# Patient Record
Sex: FEMALE | Race: OTHER | Employment: FULL TIME | URBAN - METROPOLITAN AREA
[De-identification: names, ages, dates, MRNs, and addresses within clinical notes are randomized per-mention and may not be internally consistent; named-entity substitution may affect disease eponyms.]

---

## 2018-08-10 ENCOUNTER — TELEPHONE (OUTPATIENT)
Dept: SURGERY | Age: 43
End: 2018-08-10

## 2018-08-10 ENCOUNTER — DOCUMENTATION ONLY (OUTPATIENT)
Dept: SURGERY | Age: 43
End: 2018-08-10

## 2018-08-10 ENCOUNTER — OFFICE VISIT (OUTPATIENT)
Dept: SURGERY | Age: 43
End: 2018-08-10

## 2018-08-10 VITALS
SYSTOLIC BLOOD PRESSURE: 113 MMHG | HEART RATE: 56 BPM | DIASTOLIC BLOOD PRESSURE: 70 MMHG | HEIGHT: 67 IN | BODY MASS INDEX: 26.21 KG/M2 | WEIGHT: 167 LBS

## 2018-08-10 DIAGNOSIS — R92.8 ABNORMAL MRI, BREAST: Primary | ICD-10-CM

## 2018-08-10 RX ORDER — ALPRAZOLAM 0.5 MG/1
TABLET ORAL
Refills: 0 | COMMUNITY
Start: 2018-05-21

## 2018-08-10 NOTE — COMMUNICATION BODY
HISTORY OF PRESENT ILLNESS  Tila Nath is a 39 y.o. female. HPI NEW Patient presents for consultation at the request of Dr. Donna Valdes and Heavenly Duarte, NP, for second opinion of recent breast MRI. The patient has been getting her breast imaging done at [facility]. A screening mammogram in March led to diagnostic images and a RIGHT breast biopsy (benign), which led to a breast MRI and recommendation for more biopsies. The patient has had a hard time getting the biopsies scheduled, especially since [facility] instructed her to schedule around day 10-12 of her menstrual cycle. Family history-  Mother had breast cancer at age 46. Breast imaging-  Screening mammogram 18 at [facility]: BI-RADS 0.  3D mammogram 3/6/18: BI-RADS 4. Architectural distortion right breast recommend stereo biopsy  RIGHT breast biopsy 3/27/18. Pathology benign. Breast MRI 18: BI-RADS 4. Foci seen on left breast biopsies recommended, right breast biopsy recommended. Past Medical History:   Diagnosis Date    Abnormal Pap smear     yes history of HSV    Herpes simplex without mention of complication     HX OTHER MEDICAL     history of HSV    Rhesus isoimmunization unspecified as to episode of care in pregnancy      Past Surgical History:   Procedure Laterality Date    HX  SECTION       Social History     Social History    Marital status:      Spouse name: N/A    Number of children: N/A    Years of education: N/A     Occupational History    Not on file. Social History Main Topics    Smoking status: Never Smoker    Smokeless tobacco: Never Used    Alcohol use Yes    Drug use: No    Sexual activity: Yes     Partners: Male     Other Topics Concern    Not on file     Social History Narrative     OB History      Para Term  AB Living    2 1 1   2    SAB TAB Ectopic Molar Multiple Live Births         1        Obstetric Comments    Menarche:  5. LMP: 18. # of Children:  2.   Age at Delivery of First Child:  38.   Hysterectomy/oophorectomy:  NO/NO. Breast Bx:  yes. Hx of Breast Feeding:  yes. BCP:  yes. Hormone therapy:  no.               Current Outpatient Prescriptions:     fluocinolone-hydroquinone-tretinoin (TRI-LATRELL) 0.01-4-0.05 % topical cream, Apply  to affected area nightly., Disp: , Rfl:     ALPRAZolam (XANAX) 0.5 mg tablet, TAKE 1 TABLET DAY OF PROCEDURE, Disp: , Rfl: 0    fluticasone (FLONASE) 50 mcg/actuation nasal spray, 2 Sprays by Both Nostrils route daily. , Disp: 1 Bottle, Rfl: 11    montelukast (SINGULAIR) 10 mg tablet, Take 1 Tab by mouth daily. , Disp: 30 Tab, Rfl: 11    cetirizine (ZYRTEC) 10 mg tablet, Take 1 Tab by mouth daily. , Disp: 30 Tab, Rfl: 11    mesalamine (CANASA) 1,000 mg suppository, Insert  into rectum two (2) times a day., Disp: , Rfl:     valacyclovir (VALTREX) 500 mg tablet, Take 500 mg by mouth two (2) times a day., Disp: , Rfl:   No Known Allergies      Review of Systems   Constitutional: Negative. HENT: Negative. Eyes: Negative. Respiratory: Negative. Cardiovascular: Negative. Gastrointestinal: Positive for constipation. Genitourinary: Negative. Musculoskeletal: Negative. Skin: Negative. Neurological: Negative. Endo/Heme/Allergies: Negative. Psychiatric/Behavioral: The patient has insomnia. Physical Exam   Constitutional: She is oriented to person, place, and time. She appears well-developed and well-nourished. HENT:   Head: Normocephalic. Eyes: EOM are normal.   Neck: Neck supple. Cardiovascular: Intact distal pulses. Pulmonary/Chest: Effort normal. Right breast exhibits no inverted nipple, no mass, no nipple discharge, no skin change and no tenderness. Left breast exhibits no inverted nipple, no mass, no nipple discharge, no skin change and no tenderness. Breasts are symmetrical.   Abdominal: Soft. Musculoskeletal: Normal range of motion. Lymphadenopathy:     She has no cervical adenopathy. She has no axillary adenopathy. Neurological: She is alert and oriented to person, place, and time. Skin: Skin is warm and dry. Psychiatric: She has a normal mood and affect. Nursing note and vitals reviewed. ASSESSMENT and PLAN    ICD-10-CM ICD-9-CM    1. Abnormal MRI, breast R92.8 793.89 MRI BX BREAST VAC LT 1ST LESION W/CLIP AND SPECIMEN     38 yo female with abnormal breast mri here for second opinion. I will review with our breast imagers. She wants her biopsies here. Likely this is mostly enhancement due to her cycle since she was not in optimal window for breast mri  Will have films uploaded into pacs and will call her after review.

## 2018-08-10 NOTE — PROGRESS NOTES
Type of Film: [x] CD [] FILMS  Type of Test: [x] MRI [x] MAMMO  From: [facility]  Given to: Dr. Med Singh is holding onto breast imaging CD.s  LOCATION  To be Downloaded into PACS:  YES

## 2018-08-10 NOTE — PATIENT INSTRUCTIONS
Breast Self-Exam: Care Instructions  Your Care Instructions    A breast self-exam is when you check your breasts for lumps or changes. This regular exam helps you learn how your breasts normally look and feel. Most breast problems or changes are not because of cancer. Breast self-exam is not a substitute for a mammogram. Having regular breast exams by your doctor and regular mammograms improve your chances of finding any problems with your breasts. Some women set a time each month to do a step-by-step breast self-exam. Other women like a less formal system. They might look at their breasts as they brush their teeth, or feel their breasts once in a while in the shower. If you notice a change in your breast, tell your doctor. Follow-up care is a key part of your treatment and safety. Be sure to make and go to all appointments, and call your doctor if you are having problems. It's also a good idea to know your test results and keep a list of the medicines you take. How do you do a breast self-exam?  · The best time to examine your breasts is usually one week after your menstrual period begins. Your breasts should not be tender then. If you do not have periods, you might do your exam on a day of the month that is easy to remember. · To examine your breasts:  ¨ Remove all your clothes above the waist and lie down. When you are lying down, your breast tissue spreads evenly over your chest wall, which makes it easier to feel all your breast tissue. ¨ Use the pads-not the fingertips-of the 3 middle fingers of your left hand to check your right breast. Move your fingers slowly in small coin-sized circles that overlap. ¨ Use three levels of pressure to feel of all your breast tissue. Use light pressure to feel the tissue close to the skin surface. Use medium pressure to feel a little deeper. Use firm pressure to feel your tissue close to your breastbone and ribs.  Use each pressure level to feel your breast tissue before moving on to the next spot. ¨ Check your entire breast, moving up and down as if following a strip from the collarbone to the bra line, and from the armpit to the ribs. Repeat until you have covered the entire breast.  ¨ Repeat this procedure for your left breast, using the pads of the 3 middle fingers of your right hand. · To examine your breasts while in the shower:  ¨ Place one arm over your head and lightly soap your breast on that side. ¨ Using the pads of your fingers, gently move your hand over your breast (in the strip pattern described above), feeling carefully for any lumps or changes. ¨ Repeat for the other breast.  · Have your doctor inspect anything you notice to see if you need further testing. Where can you learn more? Go to http://cole-bayron.info/. Enter P148 in the search box to learn more about \"Breast Self-Exam: Care Instructions. \"  Current as of: May 12, 2017  Content Version: 11.7  © 2934-9297 [employer]. Care instructions adapted under license by [employer] (which disclaims liability or warranty for this information). If you have questions about a medical condition or this instruction, always ask your healthcare professional. [place] [address] any warranty or liability for your use of this information.

## 2018-08-10 NOTE — LETTER
8/10/2018 1:39 PM 
 
Patient:  Mukesh Greenberg YOB: 1975 Date of Visit: 8/10/2018 Dear Marylene Prim, MD 
[address] [facility] [address] [address] VIA In Basket 
 : Thank you for referring Ms. Aldo Whiteside to me for evaluation/treatment. Below are the relevant portions of my assessment and plan of care. HISTORY OF PRESENT ILLNESS Mukesh Greenberg is a 39 y.o. female. HPI NEW Patient presents for consultation at the request of Dr. Lorenza Last and Blanca Mccarty, SARABJIT, for second opinion of recent breast MRI. The patient has been getting her breast imaging done at [facility]. A screening mammogram in March led to diagnostic images and a RIGHT breast biopsy (benign), which led to a breast MRI and recommendation for more biopsies. The patient has had a hard time getting the biopsies scheduled, especially since [facility] instructed her to schedule around day 10-12 of her menstrual cycle. Family history- 
Mother had breast cancer at age 46. Breast imaging- 
Screening mammogram 18 at [facility]: BI-RADS 0. 
3D mammogram 3/6/18: BI-RADS 4. Architectural distortion right breast recommend stereo biopsy RIGHT breast biopsy 3/27/18. Pathology benign. Breast MRI 18: BI-RADS 4. Foci seen on left breast biopsies recommended, right breast biopsy recommended. Past Medical History:  
Diagnosis Date  Abnormal Pap smear   
 yes history of HSV  Herpes simplex without mention of complication  HX OTHER MEDICAL   
 history of HSV  Rhesus isoimmunization unspecified as to episode of care in pregnancy Past Surgical History:  
Procedure Laterality Date  HX  SECTION Social History Social History  Marital status:  Spouse name: N/A  
 Number of children: N/A  
 Years of education: N/A Occupational History  Not on file. Social History Main Topics  Smoking status: Never Smoker  Smokeless tobacco: Never Used  Alcohol use Yes  Drug use: No  
 Sexual activity: Yes  
  Partners: Male Other Topics Concern  Not on file Social History Narrative OB History  Para Term  AB Living 2 1 1   2 SAB TAB Ectopic Molar Multiple Live Births 1 Obstetric Comments Menarche:  5. LMP: 18. # of Children:  2. Age at Delivery of First Child:  38.   Hysterectomy/oophorectomy:  NO/NO. Breast Bx:  yes. Hx of Breast Feeding:  yes. BCP:  yes. Hormone therapy:  no.  
 
  
  
 
 
Current Outpatient Prescriptions:  
  fluocinolone-hydroquinone-tretinoin (TRI-LATRELL) 0.01-4-0.05 % topical cream, Apply  to affected area nightly., Disp: , Rfl:  
  ALPRAZolam (XANAX) 0.5 mg tablet, TAKE 1 TABLET DAY OF PROCEDURE, Disp: , Rfl: 0 
  fluticasone (FLONASE) 50 mcg/actuation nasal spray, 2 Sprays by Both Nostrils route daily. , Disp: 1 Bottle, Rfl: 11 
  montelukast (SINGULAIR) 10 mg tablet, Take 1 Tab by mouth daily. , Disp: 30 Tab, Rfl: 11 
  cetirizine (ZYRTEC) 10 mg tablet, Take 1 Tab by mouth daily. , Disp: 30 Tab, Rfl: 11 
  mesalamine (CANASA) 1,000 mg suppository, Insert  into rectum two (2) times a day., Disp: , Rfl:  
  valacyclovir (VALTREX) 500 mg tablet, Take 500 mg by mouth two (2) times a day., Disp: , Rfl:  
No Known Allergies Review of Systems Constitutional: Negative. HENT: Negative. Eyes: Negative. Respiratory: Negative. Cardiovascular: Negative. Gastrointestinal: Positive for constipation. Genitourinary: Negative. Musculoskeletal: Negative. Skin: Negative. Neurological: Negative. Endo/Heme/Allergies: Negative. Psychiatric/Behavioral: The patient has insomnia. Physical Exam  
Constitutional: She is oriented to person, place, and time. She appears well-developed and well-nourished. HENT:  
Head: Normocephalic. Eyes: EOM are normal.  
Neck: Neck supple. Cardiovascular: Intact distal pulses. Pulmonary/Chest: Effort normal. Right breast exhibits no inverted nipple, no mass, no nipple discharge, no skin change and no tenderness. Left breast exhibits no inverted nipple, no mass, no nipple discharge, no skin change and no tenderness. Breasts are symmetrical.  
Abdominal: Soft. Musculoskeletal: Normal range of motion. Lymphadenopathy:  
  She has no cervical adenopathy. She has no axillary adenopathy. Neurological: She is alert and oriented to person, place, and time. Skin: Skin is warm and dry. Psychiatric: She has a normal mood and affect. Nursing note and vitals reviewed. ASSESSMENT and PLAN 
  ICD-10-CM ICD-9-CM 1. Abnormal MRI, breast R92.8 793.89 MRI BX BREAST VAC LT 1ST LESION W/CLIP AND SPECIMEN  
 
40 yo female with abnormal breast mri here for second opinion. I will review with our breast imagers. She wants her biopsies here. Likely this is mostly enhancement due to her cycle since she was not in optimal window for breast mri Will have films uploaded into pacs and will call her after review. If you have questions, please do not hesitate to call me. I look forward to following Ms. Honeycuttny Bernardino along with you. Sincerely, Ema Garcias MD

## 2018-08-10 NOTE — PROGRESS NOTES
HISTORY OF PRESENT ILLNESS  Ramon Kuhn is a 39 y.o. female. HPI NEW Patient presents for consultation at the request of Dr. Cole Shipley and Christina Arriaga NP, for second opinion of recent breast MRI. The patient has been getting her breast imaging done at [facility]. A screening mammogram in March led to diagnostic images and a RIGHT breast biopsy (benign), which led to a breast MRI and recommendation for more biopsies. The patient has had a hard time getting the biopsies scheduled, especially since [facility] instructed her to schedule around day 10-12 of her menstrual cycle. Family history-  Mother had breast cancer at age 46. Breast imaging-  Screening mammogram 18 at [facility]: BI-RADS 0.  3D mammogram 3/6/18: BI-RADS 4. Architectural distortion right breast recommend stereo biopsy  RIGHT breast biopsy 3/27/18. Pathology benign. Breast MRI 18: BI-RADS 4. Foci seen on left breast biopsies recommended, right breast biopsy recommended. Past Medical History:   Diagnosis Date    Abnormal Pap smear     yes history of HSV    Herpes simplex without mention of complication     HX OTHER MEDICAL     history of HSV    Rhesus isoimmunization unspecified as to episode of care in pregnancy      Past Surgical History:   Procedure Laterality Date    HX  SECTION       Social History     Social History    Marital status:      Spouse name: N/A    Number of children: N/A    Years of education: N/A     Occupational History    Not on file. Social History Main Topics    Smoking status: Never Smoker    Smokeless tobacco: Never Used    Alcohol use Yes    Drug use: No    Sexual activity: Yes     Partners: Male     Other Topics Concern    Not on file     Social History Narrative     OB History      Para Term  AB Living    2 1 1   2    SAB TAB Ectopic Molar Multiple Live Births         1        Obstetric Comments    Menarche:  5. LMP: 18. # of Children:  2.   Age at Delivery of First Child:  38.   Hysterectomy/oophorectomy:  NO/NO. Breast Bx:  yes. Hx of Breast Feeding:  yes. BCP:  yes. Hormone therapy:  no.               Current Outpatient Prescriptions:     fluocinolone-hydroquinone-tretinoin (TRI-LATRELL) 0.01-4-0.05 % topical cream, Apply  to affected area nightly., Disp: , Rfl:     ALPRAZolam (XANAX) 0.5 mg tablet, TAKE 1 TABLET DAY OF PROCEDURE, Disp: , Rfl: 0    fluticasone (FLONASE) 50 mcg/actuation nasal spray, 2 Sprays by Both Nostrils route daily. , Disp: 1 Bottle, Rfl: 11    montelukast (SINGULAIR) 10 mg tablet, Take 1 Tab by mouth daily. , Disp: 30 Tab, Rfl: 11    cetirizine (ZYRTEC) 10 mg tablet, Take 1 Tab by mouth daily. , Disp: 30 Tab, Rfl: 11    mesalamine (CANASA) 1,000 mg suppository, Insert  into rectum two (2) times a day., Disp: , Rfl:     valacyclovir (VALTREX) 500 mg tablet, Take 500 mg by mouth two (2) times a day., Disp: , Rfl:   No Known Allergies      Review of Systems   Constitutional: Negative. HENT: Negative. Eyes: Negative. Respiratory: Negative. Cardiovascular: Negative. Gastrointestinal: Positive for constipation. Genitourinary: Negative. Musculoskeletal: Negative. Skin: Negative. Neurological: Negative. Endo/Heme/Allergies: Negative. Psychiatric/Behavioral: The patient has insomnia. Physical Exam   Constitutional: She is oriented to person, place, and time. She appears well-developed and well-nourished. HENT:   Head: Normocephalic. Eyes: EOM are normal.   Neck: Neck supple. Cardiovascular: Intact distal pulses. Pulmonary/Chest: Effort normal. Right breast exhibits no inverted nipple, no mass, no nipple discharge, no skin change and no tenderness. Left breast exhibits no inverted nipple, no mass, no nipple discharge, no skin change and no tenderness. Breasts are symmetrical.   Abdominal: Soft. Musculoskeletal: Normal range of motion. Lymphadenopathy:     She has no cervical adenopathy. She has no axillary adenopathy. Neurological: She is alert and oriented to person, place, and time. Skin: Skin is warm and dry. Psychiatric: She has a normal mood and affect. Nursing note and vitals reviewed. ASSESSMENT and PLAN    ICD-10-CM ICD-9-CM    1. Abnormal MRI, breast R92.8 793.89 MRI BX BREAST VAC LT 1ST LESION W/CLIP AND SPECIMEN     38 yo female with abnormal breast mri here for second opinion. I will review with our breast imagers. She wants her biopsies here. Likely this is mostly enhancement due to her cycle since she was not in optimal window for breast mri  Will have films uploaded into pacs and will call her after review.

## 2018-08-13 ENCOUNTER — DOCUMENTATION ONLY (OUTPATIENT)
Dept: SURGERY | Age: 43
End: 2018-08-13

## 2018-08-13 NOTE — PROGRESS NOTES
Type of Film: [x] CD [] FILMS  Type of Test: [x] MRI [x] MAMMO  From: [facility]  Given to: Dr. Lev Ibrahim dropped breast imaging CDs off at [address] this AM.  To be Downloaded into PACS:  YES

## 2018-08-14 ENCOUNTER — TELEPHONE (OUTPATIENT)
Dept: MRI IMAGING | Age: 43
End: 2018-08-14

## 2018-08-15 ENCOUNTER — TELEPHONE (OUTPATIENT)
Dept: MRI IMAGING | Age: 43
End: 2018-08-15

## 2018-08-17 ENCOUNTER — TELEPHONE (OUTPATIENT)
Dept: MRI IMAGING | Age: 43
End: 2018-08-17

## 2018-08-17 DIAGNOSIS — R92.8 ABNORMAL MRI, BREAST: Primary | ICD-10-CM

## 2018-09-10 NOTE — PROGRESS NOTES
Type of Film: [x] CD [] FILMS  Type of Test: [x] MRI [x] MAMMO  From: [facility]  Given to: placed in [facility] into PACS:  YES

## 2018-09-12 ENCOUNTER — HOSPITAL ENCOUNTER (OUTPATIENT)
Dept: MRI IMAGING | Age: 43
Discharge: HOME | End: 2018-09-12
Attending: SURGERY

## 2018-09-12 DIAGNOSIS — R92.8 ABNORMAL MRI, BREAST: ICD-10-CM

## 2018-09-12 NOTE — PROGRESS NOTES
Breast MRI biopsies cancelled after Dr. Bindu Jimenez reviewed images from [facility] [place] and discussed with patient options. Patient and  talked with Dr. Bindu Jimenez. Patient to schedule Breast MRI in May 2019 and continue Mammography.

## 2019-03-09 ENCOUNTER — HOSPITAL ENCOUNTER (OUTPATIENT)
Dept: MAMMOGRAPHY | Age: 44
Discharge: HOME | End: 2019-03-09
Attending: SPECIALIST
Payer: COMMERCIAL

## 2019-03-09 DIAGNOSIS — Z12.39 SCREENING BREAST EXAMINATION: ICD-10-CM

## 2019-03-09 PROCEDURE — 77063 BREAST TOMOSYNTHESIS BI: CPT

## 2020-02-11 ENCOUNTER — HOSPITAL ENCOUNTER (OUTPATIENT)
Dept: ULTRASOUND IMAGING | Age: 45
Discharge: HOME | End: 2020-02-11
Payer: COMMERCIAL

## 2020-02-11 ENCOUNTER — HOSPITAL ENCOUNTER (OUTPATIENT)
Dept: MAMMOGRAPHY | Age: 45
Discharge: HOME | End: 2020-02-11
Payer: COMMERCIAL

## 2020-02-11 DIAGNOSIS — N63.0 LUMP OR MASS IN BREAST: ICD-10-CM

## 2020-02-11 DIAGNOSIS — N63.20 MASS OF LEFT BREAST: ICD-10-CM

## 2020-02-11 PROCEDURE — 77065 DX MAMMO INCL CAD UNI: CPT

## 2020-02-11 PROCEDURE — 76642 ULTRASOUND BREAST LIMITED: CPT

## 2020-05-01 LAB
SARS-COV-2 AB, IGG, CORG1M: NEGATIVE
SARS-COV-2 AB, IGM, CVABMT: NEGATIVE

## 2020-06-02 ENCOUNTER — HOSPITAL ENCOUNTER (OUTPATIENT)
Dept: MAMMOGRAPHY | Age: 45
Discharge: HOME | End: 2020-06-02
Attending: SPECIALIST
Payer: COMMERCIAL

## 2020-06-02 DIAGNOSIS — Z12.31 VISIT FOR SCREENING MAMMOGRAM: ICD-10-CM

## 2020-06-02 PROCEDURE — 77063 BREAST TOMOSYNTHESIS BI: CPT

## 2021-05-11 ENCOUNTER — TRANSCRIBE ORDER (OUTPATIENT)
Dept: SCHEDULING | Age: 46
End: 2021-05-11

## 2021-05-11 DIAGNOSIS — Z12.31 BREAST CANCER SCREENING BY MAMMOGRAM: Primary | ICD-10-CM

## 2021-05-21 ENCOUNTER — OFFICE VISIT (OUTPATIENT)
Dept: INTERNAL MEDICINE CLINIC | Age: 46
End: 2021-05-21

## 2021-05-21 VITALS
TEMPERATURE: 97.3 F | DIASTOLIC BLOOD PRESSURE: 70 MMHG | SYSTOLIC BLOOD PRESSURE: 109 MMHG | HEART RATE: 72 BPM | BODY MASS INDEX: 25.9 KG/M2 | OXYGEN SATURATION: 99 % | WEIGHT: 165 LBS | RESPIRATION RATE: 18 BRPM | HEIGHT: 67 IN

## 2021-05-21 DIAGNOSIS — E78.00 HIGH CHOLESTEROL: ICD-10-CM

## 2021-05-21 DIAGNOSIS — R63.5 WEIGHT GAIN: ICD-10-CM

## 2021-05-21 DIAGNOSIS — M75.42 IMPINGEMENT SYNDROME OF SHOULDER REGION, LEFT: Primary | ICD-10-CM

## 2021-05-21 DIAGNOSIS — K51.80 OTHER ULCERATIVE COLITIS WITHOUT COMPLICATION (HCC): ICD-10-CM

## 2021-05-21 DIAGNOSIS — Z00.00 ENCOUNTER FOR MEDICAL EXAMINATION TO ESTABLISH CARE: ICD-10-CM

## 2021-05-21 PROCEDURE — 99203 OFFICE O/P NEW LOW 30 MIN: CPT | Performed by: INTERNAL MEDICINE

## 2021-05-21 NOTE — PROGRESS NOTES
Reviewed record in preparation for visit and have obtained necessary documentation. Identified pt with two pt identifiers(name and ). Chief Complaint   Patient presents with   [facility]    Shoulder Pain       Health Maintenance Due   Topic Date Due    Hepatitis C Test  Never done    Pap Test  2019    Cholesterol Test   2021    Mammogram  2021       Ms. Veronique Ledesma has a reminder for a \"due or due soon\" health maintenance. I have asked that she discuss this further with her primary care provider for follow-up on this health maintenance. Coordination of Care Questionnaire:  :     1) Have you been to an emergency room, urgent care clinic since your last visit? no   Hospitalized since your last visit? no             2) Have you seen or consulted any other health care providers outside of [address] [place] [address] since your last visit? no  (Include any pap smears or colon screenings in this section.)    3) In the event something were to happen to you and you were unable to speak on your behalf, do you have an Advance Directive/ Living Will in place stating your wishes? NO    Do you have an Advance Directive on file? no    4) Are you interested in receiving information on Advance Directives? NO    Patient is accompanied by self I have received verbal consent from Kay Molina to discuss any/all medical information while they are present in the room.

## 2021-05-21 NOTE — PROGRESS NOTES
Ms. Ebony Weber is a new patient who is here to establish care. CC:  Establish Care and Shoulder Pain       HPI:      50 yo woman presenting to establish care  From [facility] / moved in 2001   Previously saw Dr. Joaquín Shields many years ago. Has ulcerative colitis and on mesalamine   Doing colonoscopy every 3 years Dr Sedrick Menchaca     History of elevated cholesterol doing lifestyle modification  LDL was 124 in 2018 then in 2020 improved to 100  HDL 70 and triglycerides 110     Working on weight loss has been struggling working out  and lost 5 lbs. current weight is 165 pounds. Having pain in left shoulder when moving shoulder back   Struggles with putting seat belt on due to pain. Reaching back is most painful. Uses that hand to speak on the phone as well.   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, translates to [address]       Hx of C section     Reviewed family history mother had breast cancer at age 46    Review of systems:  Constitutional: negative for fever, chills, weight loss, night sweats   Eyes : negative for vision changes, eye pain and discharge  Nose and Throat: negative for tinnitus, sore throat   Cardiovascular: negative for chest pain, palpitations and shortness of breath  Respiratory: negative for shortness of breath, cough and wheezing   Gastroinstestinal: negative for abdominal pain, nausea, vomiting, diarrhea, constipation, and blood in the stool  Musculoskeletal: See HPI  Genitourinary: negative for dysuria, nocturia, polyuria and hematuria   Neurologic: Negative for focal weakness, numbness or incoordination  Skin: negative for rash, pruritus  Hematologic: negative for easy bruising      Past Medical History:   Diagnosis Date    Abnormal Pap smear yes history of HSV    Herpes simplex without mention of complication     HX OTHER MEDICAL     history of HSV    Rhesus isoimmunization unspecified as to episode of care in pregnancy     Ulcerative colitis ([facility] [address]         Past Surgical History:   Procedure Laterality Date    HX  SECTION      CLARITA STEREO  BX BREAST RT 1ST LESION W/CLIP AND SPECIMEN Right 2018    benign       No Known Allergies    Current Outpatient Medications on File Prior to Visit   Medication Sig Dispense Refill    cetirizine (ZYRTEC) 10 mg tablet Take 1 Tab by mouth daily. 30 Tab 11    mesalamine (CANASA) 1,000 mg suppository Insert  into rectum two (2) times a day.  fluocinolone-hydroquinone-tretinoin (TRI-LATRELL) 0.01-4-0.05 % topical cream Apply  to affected area nightly. (Patient not taking: Reported on 2021)      ALPRAZolam (XANAX) 0.5 mg tablet TAKE 1 TABLET DAY OF PROCEDURE (Patient not taking: Reported on 2021)  0    fluticasone (FLONASE) 50 mcg/actuation nasal spray 2 Sprays by Both Nostrils route daily. (Patient not taking: Reported on 2021) 1 Bottle 11    montelukast (SINGULAIR) 10 mg tablet Take 1 Tab by mouth daily. (Patient not taking: Reported on 2021) 30 Tab 11    valacyclovir (VALTREX) 500 mg tablet Take 500 mg by mouth two (2) times a day. (Patient not taking: Reported on 2021)       No current facility-administered medications on file prior to visit. family history includes Breast Cancer (age of onset: 46) in her mother; Diabetes in her maternal grandmother and paternal grandmother; Hypertension in her father, paternal grandfather, and paternal uncle.     Social History     Socioeconomic History    Marital status:      Spouse name: Not on file    Number of children: Not on file    Years of education: Not on file    Highest education level: Not on file   Occupational History    Not on file   Tobacco Use    Smoking status: Never Smoker    Smokeless tobacco: Never Used   Substance and Sexual Activity    Alcohol use: Yes    Drug use: No    Sexual activity: Yes     Partners: Male   Other Topics Concern    Not on file   Social History Narrative    Not on file     Social Determinants of Health     Financial Resource Strain:     Difficulty of Paying Living Expenses:    Food Insecurity:     Worried About Running Out of Food in the Last Year:     [address] in the Last Year:    Transportation Needs:     Lack of Transportation (Medical):  Lack of Transportation (Non-Medical):    Physical Activity:     Days of Exercise per Week:     Minutes of Exercise per Session:    Stress:     Feeling of Stress :    Social Connections:     Frequency of Communication with Friends and Family:     Frequency of Social Gatherings with Friends and Family:     Attends Spiritism Services:     Active Member of Clubs or Organizations:     Attends Club or Organization Meetings:     Marital Status:    Intimate Partner Violence:     Fear of Current or Ex-Partner:     Emotionally Abused:     Physically Abused:     Sexually Abused:        Visit Vitals  /70 (BP 1 Location: Right arm, BP Patient Position: Sitting, BP Cuff Size: Adult)   Pulse 72   Temp 97.3 °F (36.3 °C) (Axillary)   Resp 18   Ht 5' 7\" (1.702 m)   Wt 165 lb (74.8 kg)   SpO2 99%   BMI 25.84 kg/m²     General:  Well appearing female no acute distress  HEENT:   PERRL,normal conjunctiva. External ear and canals normal, TMs normal.  Hearing normal to voice. Nose without edema or discharge, normal septum. Lips, teeth, gums normal.  Oropharynx: no erythema, no exudates, no lesions, normal tongue. Neck:  Supple. Thyroid normal size, nontender, without nodules. No carotid bruit. No masses or lymphadenopathy  Respiratory: no respiratory distress,  no wheezing, no rhonchi, no rales. No chest wall tenderness. Cardiovascular:  RRR, normal S1S2, no murmur.     Gastrointestinal: normal bowel sounds, soft, nontender, without masses. No hepatosplenomegaly. Extremities +2 pulses, no edema, normal sensation   Musculoskeletal:  Normal gait. Normal digits and nails. Normal strength and tone, no atrophy, and no abnormal movement. Normal inspection of left shoulder. Pain when lifting above 140 degrees. Pain when reaching back. Skin:  No rash, no lesions, no ulcers. Skin warm, normal turgor, without induration or nodules. Neuro:  A and OX4, fluent speech, cranial nerves normal 2-12. Sensation normal to light touch. DTR symmetrical  Psych:  Normal affect      Lab Results   Component Value Date/Time    WBC 11.7 (H) 06/08/2011 03:30 AM    HGB 9.8 (L) 06/08/2011 03:30 AM    HCT 29.5 (L) 06/08/2011 03:30 AM    PLATELET 077 02/05/5443 03:30 AM    MCV 86.5 06/08/2011 03:30 AM     No results found for: NA, K, CL, CO2, AGAP, GLU, BUN, CREA, BUCR, GFRAA, GFRNA, CA, GFRAA  No results found for: CHOL, CHOLPOCT, CHOLX, CHLST, CHOLV, HDL, HDLPOC, HDLP, LDL, LDLCPOC, LDLC, DLDLP, VLDLC, VLDL, TGLX, TRIGL, TRIGP, TGLPOCT, CHHD, CHHDX  No results found for: TSH, TSH2, TSH3, TSHP, TSHEXT, TSHEXT  Lab Results   Component Value Date/Time    Hemoglobin A1c, External 5.4 02/03/2016 12:00 AM     No results found for: VITD3, XQVID2, XQVID3, XQVID, VD3RIA                Assessment and Plan:     1. Suspect impingement syndrome of shoulder region, left  - REFERRAL TO PHYSICAL THERAPY  - XR SHOULDER LT AP/LAT MIN 2 V; Future  -Discussed using a headset at work so she is not holding her phone with the left arm    2. Encounter for medical examination to establish care  - LIPID PANEL; Future  - METABOLIC PANEL, COMPREHENSIVE; Future  - CBC WITH AUTOMATED DIFF; Future  - LIPID PANEL  - METABOLIC PANEL, COMPREHENSIVE  - CBC WITH AUTOMATED DIFF    3. High cholesterol  Recommend lifestyle changes eating a low-fat diet and regular next  - LIPID PANEL; Future  - METABOLIC PANEL, COMPREHENSIVE; Future  - CBC WITH AUTOMATED DIFF;  Future  - LIPID PANEL  - METABOLIC PANEL, COMPREHENSIVE  - CBC WITH AUTOMATED DIFF    4. Other ulcerative colitis without complication ([facility] [address]  Followed by Dr. Shauna Escoto and on mesalamine requesting records  - METABOLIC PANEL, COMPREHENSIVE; Future  - CBC WITH AUTOMATED DIFF; Future  - METABOLIC PANEL, COMPREHENSIVE  - CBC WITH AUTOMATED DIFF    5. Weight gain  - TSH 3RD GENERATION; Future  - HEMOGLOBIN A1C WITH EAG; Future  - TSH 3RD GENERATION  - HEMOGLOBIN A1C WITH EAG      Follow-up and Dispositions    · Return in about 1 year (around 5/21/2022).           Diana Perkins MD

## 2021-05-22 LAB
ALBUMIN SERPL-MCNC: 4.4 G/DL (ref 3.8–4.8)
ALBUMIN/GLOB SERPL: 1.8 {RATIO} (ref 1.2–2.2)
ALP SERPL-CCNC: 36 IU/L (ref 48–121)
ALT SERPL-CCNC: 7 IU/L (ref 0–32)
AST SERPL-CCNC: 24 IU/L (ref 0–40)
BASOPHILS # BLD AUTO: 0 X10E3/UL (ref 0–0.2)
BASOPHILS NFR BLD AUTO: 0 %
BILIRUB SERPL-MCNC: 0.2 MG/DL (ref 0–1.2)
BUN SERPL-MCNC: 10 MG/DL (ref 6–24)
BUN/CREAT SERPL: 12 (ref 9–23)
CALCIUM SERPL-MCNC: 9.5 MG/DL (ref 8.7–10.2)
CHLORIDE SERPL-SCNC: 103 MMOL/L (ref 96–106)
CHOLEST SERPL-MCNC: 182 MG/DL (ref 100–199)
CO2 SERPL-SCNC: 23 MMOL/L (ref 20–29)
CREAT SERPL-MCNC: 0.82 MG/DL (ref 0.57–1)
EOSINOPHIL # BLD AUTO: 0 X10E3/UL (ref 0–0.4)
EOSINOPHIL NFR BLD AUTO: 1 %
ERYTHROCYTE [DISTWIDTH] IN BLOOD BY AUTOMATED COUNT: 11.9 % (ref 11.7–15.4)
EST. AVERAGE GLUCOSE BLD GHB EST-MCNC: 103 MG/DL
GLOBULIN SER CALC-MCNC: 2.5 G/DL (ref 1.5–4.5)
GLUCOSE SERPL-MCNC: 88 MG/DL (ref 65–99)
HBA1C MFR BLD: 5.2 % (ref 4.8–5.6)
HCT VFR BLD AUTO: 40.9 % (ref 34–46.6)
HDLC SERPL-MCNC: 63 MG/DL
HGB BLD-MCNC: 13.6 G/DL (ref 11.1–15.9)
IMM GRANULOCYTES # BLD AUTO: 0 X10E3/UL (ref 0–0.1)
IMM GRANULOCYTES NFR BLD AUTO: 0 %
LDLC SERPL CALC-MCNC: 101 MG/DL (ref 0–99)
LYMPHOCYTES # BLD AUTO: 1.4 X10E3/UL (ref 0.7–3.1)
LYMPHOCYTES NFR BLD AUTO: 26 %
MCH RBC QN AUTO: 30.2 PG (ref 26.6–33)
MCHC RBC AUTO-ENTMCNC: 33.3 G/DL (ref 31.5–35.7)
MCV RBC AUTO: 91 FL (ref 79–97)
MONOCYTES # BLD AUTO: 0.5 X10E3/UL (ref 0.1–0.9)
MONOCYTES NFR BLD AUTO: 10 %
NEUTROPHILS # BLD AUTO: 3.4 X10E3/UL (ref 1.4–7)
NEUTROPHILS NFR BLD AUTO: 63 %
PLATELET # BLD AUTO: 269 X10E3/UL (ref 150–450)
POTASSIUM SERPL-SCNC: 4.6 MMOL/L (ref 3.5–5.2)
PROT SERPL-MCNC: 6.9 G/DL (ref 6–8.5)
RBC # BLD AUTO: 4.51 X10E6/UL (ref 3.77–5.28)
SODIUM SERPL-SCNC: 140 MMOL/L (ref 134–144)
TRIGL SERPL-MCNC: 98 MG/DL (ref 0–149)
TSH SERPL DL<=0.005 MIU/L-ACNC: 2.01 UIU/ML (ref 0.45–4.5)
VLDLC SERPL CALC-MCNC: 18 MG/DL (ref 5–40)
WBC # BLD AUTO: 5.4 X10E3/UL (ref 3.4–10.8)

## 2021-05-26 NOTE — PROGRESS NOTES
Normal cholesterol   Normal kidney and liver function   Normal blood count  Normal thyroid  No diabetes

## 2021-06-08 ENCOUNTER — HOSPITAL ENCOUNTER (OUTPATIENT)
Dept: MAMMOGRAPHY | Age: 46
Discharge: HOME | End: 2021-06-08
Attending: SPECIALIST
Payer: COMMERCIAL

## 2021-06-08 DIAGNOSIS — Z12.31 BREAST CANCER SCREENING BY MAMMOGRAM: ICD-10-CM

## 2021-06-08 PROCEDURE — 77063 BREAST TOMOSYNTHESIS BI: CPT

## 2021-06-09 ENCOUNTER — HOSPITAL ENCOUNTER (OUTPATIENT)
Dept: PHYSICAL THERAPY | Age: 46
Discharge: HOME | End: 2021-06-09
Payer: COMMERCIAL

## 2021-06-09 DIAGNOSIS — M75.42 IMPINGEMENT SYNDROME OF SHOULDER REGION, LEFT: ICD-10-CM

## 2021-06-09 PROCEDURE — 97140 MANUAL THERAPY 1/> REGIONS: CPT | Performed by: PHYSICAL THERAPIST

## 2021-06-09 PROCEDURE — 97110 THERAPEUTIC EXERCISES: CPT | Performed by: PHYSICAL THERAPIST

## 2021-06-09 PROCEDURE — 97161 PT EVAL LOW COMPLEX 20 MIN: CPT | Performed by: PHYSICAL THERAPIST

## 2021-06-09 NOTE — PROGRESS NOTES
PT INITIAL EVALUATION NOTE 2-15    Patient Name: Irma Berger  Date:2021  : 1975  [x]  Patient  Verified  Payor: Audra Johnston / Plan: Qiana Main PPO / Product Type: PPO /    In time:1135  Out time:1236  Total Treatment Time (min): 61  Visit #: 1     Treatment Area: Impingement syndrome of shoulder region, left [M75.42]    SUBJECTIVE  Pain Level (0-10 scale): 2-3  Any medication changes, allergies to medications, adverse drug reactions, diagnosis change, or new procedure performed?: [] No    [x] Yes (see summary sheet for update)  Subjective:     Pt reports that she started having pain 5-6 months ago one morning when she woke up. She thought she just slept wrong, but it has continued. She complains of difficulty reaching to grab the seatbelt. 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 She complains of increased pain with using the phone between her shoulder and ear. She is aware that she needs to try to get a headset. She has been able to return to sleeping on the left some with proper propping. She recently started going to the gym in April and she feels that she is getting stronger and the pain is improving. She has not tried to use any ice or heat to date. Her pain is isolated to the lateral and posterior aspect of the shoulder. She denies any radicular symptoms or superior shoulder or neck pain.       OBJECTIVE/EXAMINATION  OBJECTIVE  Posture:  Seated rounded shoulders and forward head, resting the left arm across her lap  Other Observations:  NA  Functional  and Pinch:  NA  Palpation: incresaed turgor and tenderness throughout the upper trap and cervical paraspinals    Shoulder AROM is WFL but left is less than right    Joint Mobility Assessment: Glenohumeral: good      Acromioclavicular: NT      Sternoclavicular: NT    UPPER QUARTER   MUSCLE STRENGTH  KEY       R  L  0 - No Contraction   Flexion  5  3  1 - Trace    Extension NT  NT  2 - Poor    Abduction 4  3  3 - Fair     IR  5  4  4 - Good    ER  4  3  5 - Normal       Neurological: Reflexes / Sensations: grossly intact  Special Tests: Neer Impingement: NT  Rich-Akin: -      Scapular Reposition: NT  Shoulder Abduction: +     Crank: NT    Load and Shift: NT    [place]: NT    Apprehension: +    Relocation : -   Speed's: +    Yergason: NT    AC Compression: -    AC Crossover: -      15 min Therapeutic Exercise:  [x] See flow sheet :   Rationale: increase ROM and increase strength to improve the patients ability to complete all activity    20 min Manual Therapy:  PROM with inf and post mobs, STM And CFM to uT and levator with gentle distraction   Rationale: decrease pain, increase ROM, increase tissue extensibility, decrease edema , correct positional vertigo and decrease trigger points  to improve the patients ability to complete all activity          With   [x] TE   [] TA   [] Neuro   [] SC   [] other: Patient Education: [x] Review HEP    [x] Progressed/Changed HEP based on:   [x] positioning   [] body mechanics   [] transfers   [] heat/ice application    [] other:        Other Objective/Functional Measures:   Pain Level (0-10 scale) post treatment: 2-3      ASSESSMENT:      [x]  See Plan of Maggy, PT 6/9/2021

## 2021-06-22 ENCOUNTER — HOSPITAL ENCOUNTER (OUTPATIENT)
Dept: PHYSICAL THERAPY | Age: 46
Discharge: HOME | End: 2021-06-22
Payer: COMMERCIAL

## 2021-06-22 PROCEDURE — 97140 MANUAL THERAPY 1/> REGIONS: CPT

## 2021-06-22 PROCEDURE — 97110 THERAPEUTIC EXERCISES: CPT

## 2021-06-22 NOTE — PROGRESS NOTES
[facility] Physical Therapy [address] ([facility] [address] Dennis Siegel Phone: [phone] Fax: [phone] Plan of Care/Statement of Necessity for Physical Therapy Services  2-15 Patient name: John Arellano  : 1975  Provider#: [id number] Referral source: Cruzito Reno MD     
Medical/Treatment Diagnosis: Impingement syndrome of shoulder region, left [M75.42] Prior Hospitalization: see medical history Comorbidities: *** 
Prior Level of Function: *** Medications: Verified on Patient Summary List 
 
Start of Care: ***      Onset Date: *** The Plan of Care and following information is based on the information from the initial evaluation. Assessment/ key information: *** Evaluation Complexity History {PT OP History :26854}; Examination {PT OP Examination :51102} ; Presentation {PT OP Presentation :54519} ; Clinical Decision Making {PT OP Decision Making :24150} Overall Complexity Rating: {PT OP Complexity:82731} Problem List: {BSHSI IP PROBLEM PKQP:03339} Treatment Plan may include any combination of the following: {Outpt PT Treatment FRMU:41956} Patient / Family readiness to learn indicated by: {Outpt PT Patient Family Readiness to Learn:55711} Persons(s) to be included in education: {IM Persons Education:63172} Barriers to Learning/Limitations: {barriers to learning/limitations:80290} Patient Goal (s): *** 
Patient Self Reported Health Status: {Outpt PT Rehabilitation Potential:57566} Rehabilitation Potential: {Outpt PT Rehabilitation Potential:25955} Short Term Goals: To be accomplished in *** {BSHSI OP WEEKS/TREATMENTS:}: 
 *** Long Term Goals: To be accomplished in *** {BSHSI OP WEEKS/TREATMENTS:}: 
 *** Frequency / Duration: Patient to be seen *** times per week for *** {BSHSI OP WEEKS/TREATMENTS:}.  
 
Patient/ Caregiver education and instruction: {Outpt PT patient caregiver ed.:59663} [x]  Plan of care has been reviewed with NEW Archer, PT 6/22/2021  
 
________________________________________________________________________ I certify that the above Therapy Services are being furnished while the patient is under my care. I agree with the treatment plan and certify that this therapy is necessary. [de-identified] Signature:____________________  Date:____________Time: _________     Kiya Jones MD

## 2021-06-22 NOTE — PROGRESS NOTES
PT DAILY TREATMENT NOTE 2-15    Patient Name: Serina Comer  Date:2021  : 1975  [x]  Patient  Verified  Payor: Rosa Guerra / Plan: Xavier Dominguez PPO / Product Type: PPO /    In time:300  Out time:358  Total Treatment Time (min): 58  Visit #:  2    Treatment Area: Impingement syndrome of left shoulder [M75.42]    SUBJECTIVE  Pain Level (0-10 scale): 0/10  Any medication changes, allergies to medications, adverse drug reactions, diagnosis change, or new procedure performed?: [x] No    [] Yes (see summary sheet for update)  Subjective functional status/changes:   [] No changes reported  Patient reports she has been more active in the gym and feels like it has been helping her shoulder. OBJECTIVE    43 min Therapeutic Exercise:  [x] See flow sheet :   Rationale: increase ROM and increase strength to improve the patients ability to perform ADLs and reduce pain levels    15 min Manual Therapy: STM upper trap, levator, paraspinals. Manual upper trap stretch. Rationale: decrease pain, increase ROM, increase tissue extensibility and decrease trigger points to improve the patients ability to perform ADLs and reduce pain levels    With   [] TE   [] TA   [] Neuro   [] SC   [] other: Patient Education: [x] Review HEP    [] Progressed/Changed HEP based on:   [] positioning   [] body mechanics   [] transfers   [] heat/ice application    [] other:      Other Objective/Functional Measures: none noted     Pain Level (0-10 scale) post treatment: 0/10    ASSESSMENT/Changes in Function:   Patient will struggle with limiting upper trap compensation patterns. Tolerated therex well, will continue to progress as tolerated.   Patient will continue to benefit from skilled PT services to modify and progress therapeutic interventions, address functional mobility deficits, address ROM deficits, address strength deficits, analyze and address soft tissue restrictions, analyze and cue movement patterns, analyze and modify body mechanics/ergonomics and assess and modify postural abnormalities to attain remaining goals. [x]  See Plan of Care  []  See progress note/recertification  []  See Discharge Summary         Progress towards goals / Updated goals:  Patient is progressing towards goals.      PLAN  [x]  Upgrade activities as tolerated     [x]  Continue plan of care  [x]  Update interventions per flow sheet       []  Discharge due to:_  []  Other:_      Eli Bee, PTA 6/22/2021

## 2021-06-29 ENCOUNTER — HOSPITAL ENCOUNTER (OUTPATIENT)
Dept: PHYSICAL THERAPY | Age: 46
Discharge: HOME | End: 2021-06-29
Payer: COMMERCIAL

## 2021-06-29 PROCEDURE — 97140 MANUAL THERAPY 1/> REGIONS: CPT

## 2021-06-29 PROCEDURE — 97110 THERAPEUTIC EXERCISES: CPT

## 2021-06-29 NOTE — PROGRESS NOTES
PT DAILY TREATMENT NOTE 2-15    Patient Name: Kami Mccormack  Date:2021  : 1975  [x]  Patient  Verified  Payor: Tanika Stern / Plan: Wally Flood PPO / Product Type: PPO /    In time:340  Out time:435  Total Treatment Time (min): 55  Visit #:  3    Treatment Area: Impingement syndrome of left shoulder [M75.42]    SUBJECTIVE  Pain Level (0-10 scale): 4.5/10  Any medication changes, allergies to medications, adverse drug reactions, diagnosis change, or new procedure performed?: [x] No    [] Yes (see summary sheet for update)  Subjective functional status/changes:   [] No changes reported  Patient reports she went dancing last Thursday and her partner spun her quickly and she felt a pop. She has been in more discomfort since then. OBJECTIVE    40 min Therapeutic Exercise:  [x] See flow sheet :   Rationale: increase ROM and increase strength to improve the patients ability to perform ADLs and reduce pain levels    15 min Manual Therapy: STM upper trap, levator, paraspinals. Manual upper trap stretch. Rationale: decrease pain, increase ROM, increase tissue extensibility and decrease trigger points to improve the patients ability to perform ADLs and reduce pain levels    With   [] TE   [] TA   [] Neuro   [] SC   [] other: Patient Education: [x] Review HEP    [] Progressed/Changed HEP based on:   [] positioning   [] body mechanics   [] transfers   [] heat/ice application    [] other:      Other Objective/Functional Measures: none noted     Pain Level (0-10 scale) post treatment: 0/10    ASSESSMENT/Changes in Function:   Increased trigger points along the levator and upper trap. Tolerated all therex, will continue to progress as tolerated.   Patient will continue to benefit from skilled PT services to modify and progress therapeutic interventions, address functional mobility deficits, address ROM deficits, address strength deficits, analyze and address soft tissue restrictions, analyze and cue movement patterns, analyze and modify body mechanics/ergonomics and assess and modify postural abnormalities to attain remaining goals. [x]  See Plan of Care  []  See progress note/recertification  []  See Discharge Summary         Progress towards goals / Updated goals:  Patient is progressing towards goals.      PLAN  [x]  Upgrade activities as tolerated     [x]  Continue plan of care  [x]  Update interventions per flow sheet       []  Discharge due to:_  []  Other:_      Beulah Combs, PTA 6/29/2021

## 2021-07-06 ENCOUNTER — HOSPITAL ENCOUNTER (OUTPATIENT)
Dept: PHYSICAL THERAPY | Age: 46
Discharge: HOME | End: 2021-07-06
Payer: COMMERCIAL

## 2021-07-06 PROCEDURE — 97110 THERAPEUTIC EXERCISES: CPT

## 2021-07-06 PROCEDURE — 97140 MANUAL THERAPY 1/> REGIONS: CPT

## 2021-07-06 NOTE — PROGRESS NOTES
PT DAILY TREATMENT NOTE 2-15    Patient Name: Hector Henderson  Date:2021  : 1975  [x]  Patient  Verified  Payor: Gael Rashid / Plan: Rere Ran PPO / Product Type: PPO /    In time:230  Out time:330  Total Treatment Time (min): 60  Visit #:  4    Treatment Area: Impingement syndrome of left shoulder [M75.42]    SUBJECTIVE  Pain Level (0-10 scale): 0/10  Any medication changes, allergies to medications, adverse drug reactions, diagnosis change, or new procedure performed?: [x] No    [] Yes (see summary sheet for update)  Subjective functional status/changes:   [] No changes reported  Patient reports she had not done her HEP over the weekend due to being on vacation. She is still in the process of getting the headset for work. OBJECTIVE    50 min Therapeutic Exercise:  [x] See flow sheet :   Rationale: increase ROM and increase strength to improve the patients ability to perform ADLs and reduce pain levels    10 min Manual Therapy: STM upper trap, levator, paraspinals. Manual upper trap stretch. Rationale: decrease pain, increase ROM, increase tissue extensibility and decrease trigger points to improve the patients ability to perform ADLs and reduce pain levels    With   [] TE   [] TA   [] Neuro   [] SC   [] other: Patient Education: [x] Review HEP    [] Progressed/Changed HEP based on:   [] positioning   [] body mechanics   [] transfers   [] heat/ice application    [] other:      Other Objective/Functional Measures: none noted     Pain Level (0-10 scale) post treatment: 0/10    ASSESSMENT/Changes in Function:   Patient will continue to have a slight upper trap compensation. Tolerated all therex well, will continue to progress as tolerated.    Patient will continue to benefit from skilled PT services to modify and progress therapeutic interventions, address functional mobility deficits, address ROM deficits, address strength deficits, analyze and address soft tissue restrictions, analyze and cue movement patterns, analyze and modify body mechanics/ergonomics and assess and modify postural abnormalities to attain remaining goals. [x]  See Plan of Care  []  See progress note/recertification  []  See Discharge Summary         Progress towards goals / Updated goals:  Patient is progressing towards goals.      PLAN  [x]  Upgrade activities as tolerated     [x]  Continue plan of care  [x]  Update interventions per flow sheet       []  Discharge due to:_  []  Other:_      Poly Zayas, PTA 7/6/2021

## 2021-08-03 ENCOUNTER — APPOINTMENT (OUTPATIENT)
Dept: PHYSICAL THERAPY | Age: 46
End: 2021-08-03
Payer: COMMERCIAL

## 2021-08-03 ENCOUNTER — HOSPITAL ENCOUNTER (OUTPATIENT)
Dept: PHYSICAL THERAPY | Age: 46
Discharge: HOME | End: 2021-08-03
Payer: COMMERCIAL

## 2021-08-03 PROCEDURE — 97110 THERAPEUTIC EXERCISES: CPT | Performed by: PHYSICAL THERAPIST

## 2022-05-23 ENCOUNTER — OFFICE VISIT (OUTPATIENT)
Dept: INTERNAL MEDICINE CLINIC | Age: 47
End: 2022-05-23
Payer: COMMERCIAL

## 2022-05-23 VITALS
DIASTOLIC BLOOD PRESSURE: 60 MMHG | HEIGHT: 66 IN | WEIGHT: 162 LBS | HEART RATE: 66 BPM | OXYGEN SATURATION: 99 % | RESPIRATION RATE: 16 BRPM | BODY MASS INDEX: 26.03 KG/M2 | SYSTOLIC BLOOD PRESSURE: 93 MMHG | TEMPERATURE: 97.6 F

## 2022-05-23 DIAGNOSIS — R63.2 EXCESSIVE EATING: ICD-10-CM

## 2022-05-23 DIAGNOSIS — K51.80 OTHER ULCERATIVE COLITIS WITHOUT COMPLICATION (HCC): ICD-10-CM

## 2022-05-23 DIAGNOSIS — E78.00 HIGH CHOLESTEROL: Primary | ICD-10-CM

## 2022-05-23 DIAGNOSIS — Z00.00 ANNUAL PHYSICAL EXAM: ICD-10-CM

## 2022-05-23 DIAGNOSIS — Z13.220 SCREENING CHOLESTEROL LEVEL: ICD-10-CM

## 2022-05-23 PROCEDURE — 99396 PREV VISIT EST AGE 40-64: CPT | Performed by: INTERNAL MEDICINE

## 2022-05-23 RX ORDER — BUPROPION HYDROCHLORIDE 100 MG/1
100 TABLET, EXTENDED RELEASE ORAL 2 TIMES DAILY
Qty: 60 TABLET | Refills: 5 | Status: SHIPPED | OUTPATIENT
Start: 2022-05-23 | End: 2022-06-21 | Stop reason: SDUPTHER

## 2022-05-23 RX ORDER — BISMUTH SUBSALICYLATE 262 MG
1 TABLET,CHEWABLE ORAL DAILY
COMMUNITY

## 2022-05-23 RX ORDER — CHOLECALCIFEROL (VITAMIN D3) 50 MCG
CAPSULE ORAL
COMMUNITY

## 2022-05-23 NOTE — PROGRESS NOTES
1. \"Have you been to the ER, urgent care clinic since your last visit? Hospitalized since your last visit? \" No    2. \"Have you seen or consulted any other health care providers outside of the [address] [place] [address] since your last visit? \" Yes Reason for visit: Duane Duval, NP OB/GYN     3. For patients aged 39-70: Has the patient had a colonoscopy / FIT/ Cologuard? Yes - Care Gap present. Rooming MA/LPN to request most recent results      If the patient is female:    4. For patients aged 41-77: Has the patient had a mammogram within the past 2 years? Yes - no Care Gap present      5. For patients aged 21-65: Has the patient had a pap smear?  Yes - no Care Gap present

## 2022-05-23 NOTE — PROGRESS NOTES
Ms.  Ms. William Manuel is a new patient who is here to establish care.      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, translates to [place]       Hx of C section     Reviewed family history mother had breast cancer at age 46 she does yearly mammograms    Review of systems:  Constitutional: negative for fever, chills, weight loss, night sweats   Eyes : negative for vision changes, eye pain and discharge  Nose and Throat: negative for tinnitus, sore throat   Cardiovascular: negative for chest pain, palpitations and shortness of breath  Respiratory: negative for shortness of breath, cough and wheezing   Gastroinstestinal: negative for abdominal pain, nausea, vomiting, diarrhea, constipation, and blood in the stool  Musculoskeletal: has occasional back pain  Genitourinary: negative for dysuria, nocturia, polyuria and hematuria   Neurologic: Negative for focal weakness, numbness or incoordination  Skin: negative for rash, pruritus  Hematologic: negative for easy bruising      Past Medical History:   Diagnosis Date    Abnormal Pap smear     yes history of HSV    Herpes simplex without mention of complication     HX OTHER MEDICAL     history of HSV    Rhesus isoimmunization unspecified as to episode of care in pregnancy     Ulcerative colitis ([facility] [address]         Past Surgical History:   Procedure Laterality Date    HX  SECTION      CLARITA STEREO  BX BREAST RT 1ST LESION W/CLIP AND SPECIMEN Right 2018    benign       No Known Allergies    Current Outpatient Medications on File Prior to Visit   Medication Sig Dispense Refill    cartilage/collagen/bor/hyalur (JOINT HEALTH PO) Take  by mouth.  omega 3-dha-epa-fish oil (Fish OiL) 100-160-1,000 mg cap Take  by mouth.  FIBER CHOICE PO Take  by mouth.  multivitamin (ONE A DAY) tablet Take 1 Tablet by mouth daily.  fluocinolone-hydroquinone-tretinoin (TRI-LATRELL) 0.01-4-0.05 % topical cream Apply  to affected area nightly.  fluticasone (FLONASE) 50 mcg/actuation nasal spray 2 Sprays by Both Nostrils route daily. 1 Bottle 11    cetirizine (ZYRTEC) 10 mg tablet Take 1 Tab by mouth daily. 30 Tab 11    mesalamine (CANASA) 1,000 mg suppository Insert  into rectum two (2) times a day.  valacyclovir (VALTREX) 500 mg tablet Take 500 mg by mouth two (2) times a day.  ALPRAZolam (XANAX) 0.5 mg tablet TAKE 1 TABLET DAY OF PROCEDURE (Patient not taking: Reported on 5/21/2021)  0    montelukast (SINGULAIR) 10 mg tablet Take 1 Tab by mouth daily. (Patient not taking: Reported on 5/23/2022) 30 Tab 11     No current facility-administered medications on file prior to visit. family history includes Breast Cancer (age of onset: 46) in her mother; Diabetes in her maternal grandmother and paternal grandmother; Hypertension in her father, paternal grandfather, and paternal uncle. Social History     Socioeconomic History    Marital status:      Spouse name: Not on file    Number of children: Not on file    Years of education: Not on file    Highest education level: Not on file   Occupational History    Not on file   Tobacco Use    Smoking status: Never Smoker    Smokeless tobacco: Never Used   Vaping Use    Vaping Use: Never used   Substance and Sexual Activity    Alcohol use:  Yes    Drug use: No    Sexual activity: Yes     Partners: Male   Other Topics Concern    Not on file   Social History Narrative    Not on file     Social Determinants of Health     Financial Resource Strain:     Difficulty of Paying Living Expenses: Not on file   Food Insecurity:     Worried About Running Out of Food in the Last Year: Not on file    Jay of Food in the Last Year: Not on file   Transportation Needs:     Lack of Transportation (Medical): Not on file    Lack of Transportation (Non-Medical): Not on file   Physical Activity:     Days of Exercise per Week: Not on file    Minutes of Exercise per Session: Not on file   Stress:     Feeling of Stress : Not on file   Social Connections:     Frequency of Communication with Friends and Family: Not on file    Frequency of Social Gatherings with Friends and Family: Not on file    Attends Latter-day Services: Not on file    Active Member of [address] [place] [address] [employer] or Organizations: Not on file    Attends Club or Organization Meetings: Not on file    Marital Status: Not on file   Intimate Partner Violence:     Fear of Current or Ex-Partner: Not on file    Emotionally Abused: Not on file    Physically Abused: Not on file    Sexually Abused: Not on file   Housing Stability:     Unable to Pay for Housing in the Last Year: Not on file    Number of [place] in the Last Year: Not on file    Unstable Housing in the Last Year: Not on file       Visit Vitals  BP 93/60   Pulse 66   Temp 97.6 °F (36.4 °C) (Temporal)   Resp 16   Ht 5' 6\" (1.676 m)   Wt 162 lb (73.5 kg)   LMP 05/10/2022 (Approximate) Comment: Nuvaring   SpO2 99%   BMI 26.15 kg/m²     General:  Well appearing female no acute distress  HEENT:   PERRL,normal conjunctiva. External ear and canals normal, TMs normal.  Hearing normal to voice. Nose without edema or discharge, normal septum. Lips, teeth, gums normal.  Oropharynx: no erythema, no exudates, no lesions, normal tongue. Neck:  Supple. Thyroid normal size, nontender, without nodules. No carotid bruit.  No masses or lymphadenopathy  Respiratory: no respiratory distress,  no wheezing, no rhonchi, no rales. No chest wall tenderness. Cardiovascular:  RRR, normal S1S2, no murmur. Gastrointestinal: normal bowel sounds, soft, nontender, without masses. No hepatosplenomegaly. Extremities +2 pulses, no edema, normal sensation   Musculoskeletal:  Normal gait. Normal digits and nails. Normal strength and tone, no atrophy, and no abnormal movement. Normal inspection of left shoulder. Pain when lifting above 140 degrees. Pain when reaching back. Skin:  No rash, no lesions, no ulcers. Skin warm, normal turgor, without induration or nodules.   Neuro:  A and OX4, fluent speech, cranial nerves normal 2-12  Psych:  Normal affect      Lab Results   Component Value Date/Time    WBC 5.4 05/21/2021 12:00 AM    HGB 13.6 05/21/2021 12:00 AM    HCT 40.9 05/21/2021 12:00 AM    PLATELET 724 31/93/0089 12:00 AM    MCV 91 05/21/2021 12:00 AM     Lab Results   Component Value Date/Time    Sodium 140 05/21/2021 12:00 AM    Potassium 4.6 05/21/2021 12:00 AM    Chloride 103 05/21/2021 12:00 AM    CO2 23 05/21/2021 12:00 AM    Glucose 88 05/21/2021 12:00 AM    BUN 10 05/21/2021 12:00 AM    Creatinine 0.82 05/21/2021 12:00 AM    BUN/Creatinine ratio 12 05/21/2021 12:00 AM    GFR est AA 99 05/21/2021 12:00 AM    GFR est non-AA 85 05/21/2021 12:00 AM    Calcium 9.5 05/21/2021 12:00 AM     Lab Results   Component Value Date/Time    Cholesterol, total 182 05/21/2021 12:00 AM    HDL Cholesterol 63 05/21/2021 12:00 AM    LDL, calculated 101 (H) 05/21/2021 12:00 AM    VLDL, calculated 18 05/21/2021 12:00 AM    Triglyceride 98 05/21/2021 12:00 AM     Lab Results   Component Value Date/Time    TSH 2.010 05/21/2021 12:00 AM     Lab Results   Component Value Date/Time    Hemoglobin A1c 5.2 05/21/2021 12:00 AM    Hemoglobin A1c, External 5.1 02/07/2020 12:00 AM     No results found for: VITKYLER, Glendy Baltazar, HARLEY, VD3RIA                Assessment and Plan: 1. High cholesterol  - LIPID PANEL; Future  - TSH 3RD GENERATION; Future  - LIPID PANEL  - TSH 3RD GENERATION    2. Other ulcerative colitis without complication ([facility] [address]  Followed by Dr Kat Hobbs on mesalamine  - METABOLIC PANEL, COMPREHENSIVE; Future  - CBC WITH AUTOMATED DIFF; Future  - TSH 3RD GENERATION; Future  - METABOLIC PANEL, COMPREHENSIVE  - CBC WITH AUTOMATED DIFF  - TSH 3RD GENERATION    3. Annual physical exam  - LIPID PANEL; Future  - METABOLIC PANEL, COMPREHENSIVE; Future  - CBC WITH AUTOMATED DIFF; Future  - TSH 3RD GENERATION; Future  - LIPID PANEL  - METABOLIC PANEL, COMPREHENSIVE  - CBC WITH AUTOMATED DIFF  - TSH 3RD GENERATION  - HEMOGLOBIN A1C WITH EAG; Future  - HEMOGLOBIN A1C WITH EAG    4. Screening cholesterol level  - HEMOGLOBIN A1C WITH EAG; Future  - HEMOGLOBIN A1C WITH EAG    5. Excessive eating  Reports emotional eating, stress eating. Discussed eating slower smaller portions, start Wellbutrin low dose start with one pill a day and if tolerated increase to BID  - buPROPion SR (WELLBUTRIN SR) 100 mg SR tablet; Take 1 Tablet by mouth two (2) times a day. Dispense: 60 Tablet;  Refill: 5

## 2022-05-26 LAB
ALBUMIN SERPL-MCNC: 4.5 G/DL (ref 3.8–4.8)
ALBUMIN/GLOB SERPL: 1.7 {RATIO} (ref 1.2–2.2)
ALP SERPL-CCNC: 41 IU/L (ref 44–121)
ALT SERPL-CCNC: 8 IU/L (ref 0–32)
AST SERPL-CCNC: 13 IU/L (ref 0–40)
BASOPHILS # BLD AUTO: 0 X10E3/UL (ref 0–0.2)
BASOPHILS NFR BLD AUTO: 0 %
BILIRUB SERPL-MCNC: <0.2 MG/DL (ref 0–1.2)
BUN SERPL-MCNC: 13 MG/DL (ref 6–24)
BUN/CREAT SERPL: 15 (ref 9–23)
CALCIUM SERPL-MCNC: 9.2 MG/DL (ref 8.7–10.2)
CHLORIDE SERPL-SCNC: 102 MMOL/L (ref 96–106)
CHOLEST SERPL-MCNC: 186 MG/DL (ref 100–199)
CO2 SERPL-SCNC: 23 MMOL/L (ref 20–29)
CREAT SERPL-MCNC: 0.84 MG/DL (ref 0.57–1)
EGFR: 86 ML/MIN/1.73
EOSINOPHIL # BLD AUTO: 0.2 X10E3/UL (ref 0–0.4)
EOSINOPHIL NFR BLD AUTO: 4 %
ERYTHROCYTE [DISTWIDTH] IN BLOOD BY AUTOMATED COUNT: 11.8 % (ref 11.7–15.4)
EST. AVERAGE GLUCOSE BLD GHB EST-MCNC: 108 MG/DL
GLOBULIN SER CALC-MCNC: 2.7 G/DL (ref 1.5–4.5)
GLUCOSE SERPL-MCNC: 86 MG/DL (ref 65–99)
HBA1C MFR BLD: 5.4 % (ref 4.8–5.6)
HCT VFR BLD AUTO: 40.9 % (ref 34–46.6)
HDLC SERPL-MCNC: 58 MG/DL
HGB BLD-MCNC: 13.7 G/DL (ref 11.1–15.9)
IMM GRANULOCYTES # BLD AUTO: 0 X10E3/UL (ref 0–0.1)
IMM GRANULOCYTES NFR BLD AUTO: 0 %
LDLC SERPL CALC-MCNC: 109 MG/DL (ref 0–99)
LYMPHOCYTES # BLD AUTO: 1.4 X10E3/UL (ref 0.7–3.1)
LYMPHOCYTES NFR BLD AUTO: 31 %
MCH RBC QN AUTO: 30.8 PG (ref 26.6–33)
MCHC RBC AUTO-ENTMCNC: 33.5 G/DL (ref 31.5–35.7)
MCV RBC AUTO: 92 FL (ref 79–97)
MONOCYTES # BLD AUTO: 0.4 X10E3/UL (ref 0.1–0.9)
MONOCYTES NFR BLD AUTO: 8 %
NEUTROPHILS # BLD AUTO: 2.6 X10E3/UL (ref 1.4–7)
NEUTROPHILS NFR BLD AUTO: 57 %
PLATELET # BLD AUTO: 274 X10E3/UL (ref 150–450)
POTASSIUM SERPL-SCNC: 4.3 MMOL/L (ref 3.5–5.2)
PROT SERPL-MCNC: 7.2 G/DL (ref 6–8.5)
RBC # BLD AUTO: 4.45 X10E6/UL (ref 3.77–5.28)
SODIUM SERPL-SCNC: 139 MMOL/L (ref 134–144)
TRIGL SERPL-MCNC: 105 MG/DL (ref 0–149)
TSH SERPL DL<=0.005 MIU/L-ACNC: 2.52 UIU/ML (ref 0.45–4.5)
VLDLC SERPL CALC-MCNC: 19 MG/DL (ref 5–40)
WBC # BLD AUTO: 4.6 X10E3/UL (ref 3.4–10.8)

## 2022-06-01 NOTE — PROGRESS NOTES
Cholesterol is at goal for age   Normal kidney and liver function   Normal blood count  Normal thyroid  No diabetes

## 2022-06-07 ENCOUNTER — TELEPHONE (OUTPATIENT)
Dept: INTERNAL MEDICINE CLINIC | Age: 47
End: 2022-06-07

## 2022-06-07 NOTE — TELEPHONE ENCOUNTER
Patient called regarding twisting her ankle/foot on Saturday. Patient stated she has been running with a group and [place] through run she twisted her foot. Felt some pain but continued for the next 4 miles or so. No foot is swollen, bruised and pain level a 4 or 5. Advised to go to [facility] and provided address and information. Advised it is a walk in clinic and they are open until 8. Patient thanked nurse. No further questions.

## 2022-06-21 DIAGNOSIS — R63.2 EXCESSIVE EATING: ICD-10-CM

## 2022-06-21 RX ORDER — BUPROPION HYDROCHLORIDE 100 MG/1
100 TABLET, EXTENDED RELEASE ORAL 2 TIMES DAILY
Qty: 180 TABLET | Refills: 1 | Status: SHIPPED | OUTPATIENT
Start: 2022-06-21

## 2022-06-21 NOTE — TELEPHONE ENCOUNTER
Future Appointments:  Future Appointments   Date Time Provider Anahi Whitehead   6/29/2022  2:00 PM ED [facility] ER [facility]   11/25/2022  8:30 AM Appa Kiya Bennett MD [facility]        Last Appointment With Me:  5/23/2022     Requested Prescriptions     Pending Prescriptions Disp Refills    buPROPion SR (WELLBUTRIN SR) 100 mg SR tablet 180 Tablet 1     Sig: Take 1 Tablet by mouth two (2) times a day.

## 2022-06-29 ENCOUNTER — HOSPITAL ENCOUNTER (OUTPATIENT)
Dept: MAMMOGRAPHY | Age: 47
Discharge: HOME | End: 2022-06-29
Payer: COMMERCIAL

## 2022-06-29 DIAGNOSIS — Z12.31 VISIT FOR SCREENING MAMMOGRAM: ICD-10-CM

## 2022-06-29 PROCEDURE — 77063 BREAST TOMOSYNTHESIS BI: CPT

## 2022-08-08 ENCOUNTER — TELEPHONE (OUTPATIENT)
Dept: INTERNAL MEDICINE CLINIC | Age: 47
End: 2022-08-08

## 2022-08-08 NOTE — TELEPHONE ENCOUNTER
Left message that dr Bolaños Fillers not in the office on the 25. .  I rescheduled for first available ,  If the new date and time doesn't work to call us back

## 2022-12-05 ENCOUNTER — OFFICE VISIT (OUTPATIENT)
Dept: INTERNAL MEDICINE CLINIC | Age: 47
End: 2022-12-05
Payer: COMMERCIAL

## 2022-12-05 VITALS
RESPIRATION RATE: 16 BRPM | DIASTOLIC BLOOD PRESSURE: 88 MMHG | TEMPERATURE: 98.1 F | WEIGHT: 165 LBS | OXYGEN SATURATION: 97 % | HEART RATE: 79 BPM | SYSTOLIC BLOOD PRESSURE: 135 MMHG | BODY MASS INDEX: 26.52 KG/M2 | HEIGHT: 66 IN

## 2022-12-05 DIAGNOSIS — E66.3 OVER WEIGHT: Primary | ICD-10-CM

## 2022-12-05 PROCEDURE — 99213 OFFICE O/P EST LOW 20 MIN: CPT | Performed by: INTERNAL MEDICINE

## 2022-12-05 NOTE — PROGRESS NOTES
1. \"Have you been to the ER, urgent care clinic since your last visit? Hospitalized since your last visit? \" No    2. \"Have you seen or consulted any other health care providers outside of the [address] [place] [address] since your last visit? \" No     3. For patients aged 39-70: Has the patient had a colonoscopy / FIT/ Cologuard? Yes - no Care Gap present      If the patient is female:    4. For patients aged 41-77: Has the patient had a mammogram within the past 2 years? Yes - no Care Gap present      5. For patients aged 21-65: Has the patient had a pap smear?  Yes - no Care Gap present

## 2022-12-05 NOTE — PROGRESS NOTES
Ms. Kasey Rasheed is presenting to follow up     CC:  Follow-up and Depression       HPI:      She is concerned with weight BMI 26     She was 145  lbs  15  years ago and she is frustrated     Startedon wellbutrin and noted no change - I advised her to stop medication     She runs daily     She has UC and on mesalamine suppository daily and up to date on colonoscopy    Repeat BP was normal 115/80 today    She is having difficulty with sleeping through the night snacking at night   Reports eating healthy dried fruits  The smallest dose makes her wake up       Review of systems:  Constitutional: negative for fever, chills, weight loss, night sweats   Eyes : negative for vision changes, eye pain and discharge  Nose and Throat: negative for tinnitus, sore throat   Cardiovascular: negative for chest pain, palpitations and shortness of breath  Respiratory: negative for shortness of breath, cough and wheezing   Gastroinstestinal: negative for abdominal pain, nausea, vomiting, diarrhea, constipation, and blood in the stool  Musculoskeletal: negative for back ache and joint ache   Genitourinary: negative for dysuria, nocturia, polyuria and hematuria   Neurologic: Negative for focal weakness, numbness or incoordination  Skin: negative for rash, pruritus  Hematologic: negative for easy bruising      Past Medical History:   Diagnosis Date    Abnormal Pap smear     yes history of HSV    Herpes simplex without mention of complication     HX OTHER MEDICAL     history of HSV    Rhesus isoimmunization unspecified as to episode of care in pregnancy     Ulcerative colitis ([facility] [address]         Past Surgical History:   Procedure Laterality Date    HX  SECTION      CLARITA STEREO  BX BREAST RT 1ST LESION W/CLIP AND SPECIMEN Right     benign       No Known Allergies    Current Outpatient Medications on File Prior to Visit   Medication Sig Dispense Refill    buPROPion SR (WELLBUTRIN SR) 100 mg SR tablet Take 1 Tablet by mouth two (2) times a day. (Patient taking differently: Take 100 mg by mouth two (2) times a day. Per Patient, she sometimes forgets to take tab a night.) 180 Tablet 1    omega 3-dha-epa-fish oil (Fish OiL) 100-160-1,000 mg cap Take  by mouth. FIBER CHOICE PO Take  by mouth.      multivitamin (ONE A DAY) tablet Take 1 Tablet by mouth daily. fluticasone (FLONASE) 50 mcg/actuation nasal spray 2 Sprays by Both Nostrils route daily. 1 Bottle 11    montelukast (SINGULAIR) 10 mg tablet Take 1 Tab by mouth daily. 30 Tab 11    cetirizine (ZYRTEC) 10 mg tablet Take 1 Tab by mouth daily. 30 Tab 11    mesalamine (CANASA) 1,000 mg suppository Insert  into rectum two (2) times a day. valACYclovir (VALTREX) 500 mg tablet Take 500 mg by mouth two (2) times a day. cartilage/collagen/bor/hyalur (JOINT HEALTH PO) Take  by mouth. (Patient not taking: Reported on 12/5/2022)      fluocinolone-hydroquinone-tretinoin (TRI-LATRELL) 0.01-4-0.05 % topical cream Apply  to affected area nightly. ALPRAZolam (XANAX) 0.5 mg tablet TAKE 1 TABLET DAY OF PROCEDURE (Patient not taking: Reported on 12/5/2022)  0     No current facility-administered medications on file prior to visit. family history includes Breast Cancer (age of onset: 46) in her mother; Diabetes in her maternal grandmother and paternal grandmother; Hypertension in her father, paternal grandfather, and paternal uncle.     Social History     Socioeconomic History    Marital status:      Spouse name: Not on file    Number of children: Not on file    Years of education: Not on file    Highest education level: Not on file   Occupational History    Not on file   Tobacco Use    Smoking status: Never    Smokeless tobacco: Never   Vaping Use    Vaping Use: Never used   Substance and Sexual Activity    Alcohol use: Yes    Drug use: No    Sexual activity: Yes     Partners: Male   Other Topics Concern    Not on file   Social History Narrative    Not on file     Social Determinants of Health     Financial Resource Strain: Low Risk     Difficulty of Paying Living Expenses: Not very hard   Food Insecurity: No Food Insecurity    Worried About Running Out of Food in the Last Year: Never true    Ran Out of Food in the Last Year: Never true   Transportation Needs: Not on file   Physical Activity: Not on file   Stress: Not on file   Social Connections: Not on file   Intimate Partner Violence: Not on file   Housing Stability: Not on file       Visit Vitals  /88   Pulse 79   Temp 98.1 °F (36.7 °C) (Temporal)   Resp 16   Ht 5' 6\" (1.676 m)   Wt 165 lb (74.8 kg)   LMP 11/27/2022 (Exact Date)   SpO2 97%   BMI 26.63 kg/m²     General:  Well appearing female no acute distress  HEENT:   PERRL,normal conjunctiva. External ear and canals normal, TMs normal.  Hearing normal to voice. Nose without edema or discharge, normal septum. Lips, teeth, gums normal.  Oropharynx: no erythema, no exudates, no lesions, normal tongue. Neck:  Supple. Thyroid normal size, nontender, without nodules. No carotid bruit. No masses or lymphadenopathy  Respiratory: no respiratory distress,  no wheezing, no rhonchi, no rales. No chest wall tenderness. Cardiovascular:  RRR, normal S1S2, no murmur. Gastrointestinal: normal bowel sounds, soft, nontender, without masses. No hepatosplenomegaly. Extremities +2 pulses, no edema, normal sensation   Musculoskeletal:  Normal gait. Normal digits and nails. Normal strength and tone, no atrophy, and no abnormal movement. Skin:  No rash, no lesions, no ulcers. Skin warm, normal turgor, without induration or nodules. Neuro:  A and OX4, fluent speech, cranial nerves normal 2-12. Sensation normal to light touch.   DTR symmetrical  Psych:  Normal affect      Lab Results   Component Value Date/Time    WBC 4.6 05/25/2022 12:00 AM    HGB 13.7 05/25/2022 12:00 AM    HCT 40.9 05/25/2022 12:00 AM    PLATELET 580 26/01/5681 12:00 AM    MCV 92 05/25/2022 12:00 AM     Lab Results   Component Value Date/Time    Sodium 139 05/25/2022 12:00 AM    Potassium 4.3 05/25/2022 12:00 AM    Chloride 102 05/25/2022 12:00 AM    CO2 23 05/25/2022 12:00 AM    Glucose 86 05/25/2022 12:00 AM    BUN 13 05/25/2022 12:00 AM    Creatinine 0.84 05/25/2022 12:00 AM    BUN/Creatinine ratio 15 05/25/2022 12:00 AM    GFR est AA 99 05/21/2021 12:00 AM    GFR est non-AA 85 05/21/2021 12:00 AM    Calcium 9.2 05/25/2022 12:00 AM     Lab Results   Component Value Date/Time    Cholesterol, total 186 05/25/2022 12:00 AM    HDL Cholesterol 58 05/25/2022 12:00 AM    LDL, calculated 109 (H) 05/25/2022 12:00 AM    VLDL, calculated 19 05/25/2022 12:00 AM    Triglyceride 105 05/25/2022 12:00 AM     Lab Results   Component Value Date/Time    TSH 2.520 05/25/2022 12:00 AM     Lab Results   Component Value Date/Time    Hemoglobin A1c 5.4 05/25/2022 12:00 AM    Hemoglobin A1c, External 5.1 02/07/2020 12:00 AM     No results found for: VITD3, XQVID2, XQVID3, XQVID, VD3RIA                Assessment and Plan:       1. Over weight  Discussed she is very close to her goal weight, and should continue with healthy diet and exercise.  No medication indicated  Stop Wellbutrin     2. UC: on mesalamine suppository       Matilde Emanuel MD

## 2022-12-05 NOTE — PATIENT INSTRUCTIONS
Stop the wellbutrin     Take melatonin extended release    Avoid night snacking     Increase protein

## 2023-07-26 ENCOUNTER — HOSPITAL ENCOUNTER (OUTPATIENT)
Facility: HOSPITAL | Age: 48
Discharge: HOME | End: 2023-07-29
Attending: INTERNAL MEDICINE
Payer: COMMERCIAL

## 2023-07-26 VITALS — HEIGHT: 66 IN | BODY MASS INDEX: 26.5 KG/M2 | WEIGHT: 164.9 LBS

## 2023-07-26 DIAGNOSIS — Z12.31 VISIT FOR SCREENING MAMMOGRAM: ICD-10-CM

## 2023-07-26 PROCEDURE — 77063 BREAST TOMOSYNTHESIS BI: CPT

## 2024-07-09 ENCOUNTER — TRANSCRIBE ORDERS (OUTPATIENT)
Facility: HOSPITAL | Age: 49
End: 2024-07-09

## 2024-07-09 DIAGNOSIS — Z12.31 VISIT FOR SCREENING MAMMOGRAM: Primary | ICD-10-CM

## 2024-07-30 ENCOUNTER — HOSPITAL ENCOUNTER (OUTPATIENT)
Facility: HOSPITAL | Age: 49
Discharge: HOME | End: 2024-08-02
Attending: INTERNAL MEDICINE
Payer: COMMERCIAL

## 2024-07-30 VITALS — BODY MASS INDEX: 27.47 KG/M2 | WEIGHT: 164.9 LBS | HEIGHT: 65 IN

## 2024-07-30 DIAGNOSIS — Z12.31 VISIT FOR SCREENING MAMMOGRAM: ICD-10-CM

## 2024-07-30 PROCEDURE — 77063 BREAST TOMOSYNTHESIS BI: CPT

## 2024-09-06 ENCOUNTER — OFFICE VISIT (OUTPATIENT)
Age: 49
End: 2024-09-06

## 2024-09-06 VITALS
TEMPERATURE: 98.2 F | SYSTOLIC BLOOD PRESSURE: 109 MMHG | WEIGHT: 173.4 LBS | BODY MASS INDEX: 28.89 KG/M2 | HEIGHT: 65 IN | OXYGEN SATURATION: 97 % | RESPIRATION RATE: 18 BRPM | HEART RATE: 65 BPM | DIASTOLIC BLOOD PRESSURE: 71 MMHG

## 2024-09-06 DIAGNOSIS — Z23 NEEDS FLU SHOT: ICD-10-CM

## 2024-09-06 DIAGNOSIS — Z86.79 HX OF VENTRICULAR FIBRILLATION: ICD-10-CM

## 2024-09-06 DIAGNOSIS — E78.00 ELEVATED CHOLESTEROL: ICD-10-CM

## 2024-09-06 DIAGNOSIS — Z95.5 HISTORY OF PLACEMENT OF STENT IN LAD CORONARY ARTERY: ICD-10-CM

## 2024-09-06 DIAGNOSIS — K51.20 ULCERATIVE PROCTITIS WITHOUT COMPLICATION (HCC): ICD-10-CM

## 2024-09-06 DIAGNOSIS — Z86.79 HX OF CORONARY ARTERY DISEASE: Primary | ICD-10-CM

## 2024-09-06 DIAGNOSIS — Z00.00 ANNUAL PHYSICAL EXAM: ICD-10-CM

## 2024-09-06 RX ORDER — CLOPIDOGREL BISULFATE 75 MG/1
75 TABLET ORAL DAILY
COMMUNITY

## 2024-09-06 RX ORDER — ACYCLOVIR 800 MG/1
800 TABLET ORAL DAILY
COMMUNITY

## 2024-09-06 RX ORDER — ASPIRIN 81 MG/1
81 TABLET, CHEWABLE ORAL DAILY
COMMUNITY
Start: 2024-04-22 | End: 2025-04-22

## 2024-09-06 RX ORDER — NITROGLYCERIN 0.4 MG/1
0.4 TABLET SUBLINGUAL EVERY 5 MIN PRN
COMMUNITY
Start: 2024-03-08

## 2024-09-06 RX ORDER — MULTIVIT-MINERALS/FA/LYCOPENE 400-370MCG
1 TABLET ORAL DAILY
COMMUNITY

## 2024-09-06 RX ORDER — ATORVASTATIN CALCIUM 80 MG/1
80 TABLET, FILM COATED ORAL DAILY
COMMUNITY

## 2024-09-06 RX ORDER — METOPROLOL SUCCINATE 25 MG/1
25 TABLET, EXTENDED RELEASE ORAL DAILY
COMMUNITY

## 2024-09-06 SDOH — ECONOMIC STABILITY: FOOD INSECURITY: WITHIN THE PAST 12 MONTHS, THE FOOD YOU BOUGHT JUST DIDN'T LAST AND YOU DIDN'T HAVE MONEY TO GET MORE.: NEVER TRUE

## 2024-09-06 SDOH — ECONOMIC STABILITY: INCOME INSECURITY: HOW HARD IS IT FOR YOU TO PAY FOR THE VERY BASICS LIKE FOOD, HOUSING, MEDICAL CARE, AND HEATING?: NOT HARD AT ALL

## 2024-09-06 SDOH — ECONOMIC STABILITY: FOOD INSECURITY: WITHIN THE PAST 12 MONTHS, YOU WORRIED THAT YOUR FOOD WOULD RUN OUT BEFORE YOU GOT MONEY TO BUY MORE.: NEVER TRUE

## 2024-09-06 ASSESSMENT — PATIENT HEALTH QUESTIONNAIRE - PHQ9
2. FEELING DOWN, DEPRESSED OR HOPELESS: NOT AT ALL
SUM OF ALL RESPONSES TO PHQ QUESTIONS 1-9: 0
1. LITTLE INTEREST OR PLEASURE IN DOING THINGS: NOT AT ALL
SUM OF ALL RESPONSES TO PHQ QUESTIONS 1-9: 0
SUM OF ALL RESPONSES TO PHQ9 QUESTIONS 1 & 2: 0

## 2024-09-06 NOTE — PROGRESS NOTES
After obtaining consent, and per verbal order from Rocio Bello MD, patient received influenza vaccine given in  Right deltoid  Influenza Vaccine 0.5 mL IM now. Patient was observed for 10 minutes post injection. Patient tolerated injection well    .\"Have you been to the ER, urgent care clinic since your last visit?  Hospitalized since your last visit?\"    Yes, [facility] for cardiac rest     “Have you seen or consulted any other health care providers outside our system since your last visit?”    Yes, [facility] for cardiac rest      “Have you had a colorectal cancer screening such as a colonoscopy/FIT/Cologuard?    NO    Date of last Colonoscopy: 1/22/2021  No cologuard on file  No FIT/FOBT on file   No flexible sigmoidoscopy on file       Click Here for Release of Records Request  
2-12.    Psych:  Normal affect   Objective   Blood pressure 109/71, pulse 65, temperature 98.2 °F (36.8 °C), resp. rate 18, height 1.651 m (5' 5\"), weight 78.7 kg (173 lb 6.4 oz), SpO2 97%.  Physical Exam             The patient (or guardian, if applicable) and other individuals in attendance with the patient were advised that Artificial Intelligence will be utilized during this visit to record, process the conversation to generate a clinical note and to support improvement of the AI technology. The patient (or guardian, if applicable) and other individuals in attendance at the appointment consented to the use of AI, including the recording.      An electronic signature was used to authenticate this note.    --Rocio Gross MD

## 2024-09-07 LAB
ALBUMIN SERPL-MCNC: 4.4 G/DL (ref 3.9–4.9)
ALP SERPL-CCNC: 63 IU/L (ref 44–121)
ALT SERPL-CCNC: 12 IU/L (ref 0–32)
AST SERPL-CCNC: 24 IU/L (ref 0–40)
BILIRUB DIRECT SERPL-MCNC: 0.12 MG/DL (ref 0–0.4)
BILIRUB SERPL-MCNC: 0.3 MG/DL (ref 0–1.2)
PROT SERPL-MCNC: 6.9 G/DL (ref 6–8.5)

## 2024-09-20 ENCOUNTER — NURSE ONLY (OUTPATIENT)
Age: 49
End: 2024-09-20
Payer: COMMERCIAL

## 2024-09-20 DIAGNOSIS — Z23 IMMUNIZATION DUE: Primary | ICD-10-CM

## 2024-09-20 PROCEDURE — 90750 HZV VACC RECOMBINANT IM: CPT | Performed by: INTERNAL MEDICINE

## 2024-09-20 PROCEDURE — 90471 IMMUNIZATION ADMIN: CPT | Performed by: INTERNAL MEDICINE

## 2024-11-22 ENCOUNTER — NURSE ONLY (OUTPATIENT)
Age: 49
End: 2024-11-22
Payer: COMMERCIAL

## 2024-11-22 DIAGNOSIS — Z00.00 ANNUAL PHYSICAL EXAM: ICD-10-CM

## 2024-11-22 DIAGNOSIS — Z23 IMMUNIZATION DUE: Primary | ICD-10-CM

## 2024-11-22 PROCEDURE — 90471 IMMUNIZATION ADMIN: CPT | Performed by: INTERNAL MEDICINE

## 2024-11-22 PROCEDURE — 90750 HZV VACC RECOMBINANT IM: CPT | Performed by: INTERNAL MEDICINE

## 2024-11-22 NOTE — PROGRESS NOTES
Identified pt with two pt identifiers(name and ). Reviewed record in preparation for visit and have obtained necessary documentation.  Chief Complaint   Patient presents with    Other     Shingles vaccine        There were no vitals filed for this visit.        Birgit Mckeon [facility]  [address], [address]   [place]  [address]  W: [phone]  F: [phone]    Pt. Tolerated injection.

## 2025-03-25 SDOH — ECONOMIC STABILITY: INCOME INSECURITY: IN THE LAST 12 MONTHS, WAS THERE A TIME WHEN YOU WERE NOT ABLE TO PAY THE MORTGAGE OR RENT ON TIME?: NO

## 2025-03-25 SDOH — ECONOMIC STABILITY: TRANSPORTATION INSECURITY
IN THE PAST 12 MONTHS, HAS THE LACK OF TRANSPORTATION KEPT YOU FROM MEDICAL APPOINTMENTS OR FROM GETTING MEDICATIONS?: NO

## 2025-03-25 SDOH — ECONOMIC STABILITY: FOOD INSECURITY: WITHIN THE PAST 12 MONTHS, THE FOOD YOU BOUGHT JUST DIDN'T LAST AND YOU DIDN'T HAVE MONEY TO GET MORE.: NEVER TRUE

## 2025-03-25 SDOH — ECONOMIC STABILITY: FOOD INSECURITY: WITHIN THE PAST 12 MONTHS, YOU WORRIED THAT YOUR FOOD WOULD RUN OUT BEFORE YOU GOT MONEY TO BUY MORE.: NEVER TRUE

## 2025-03-25 SDOH — ECONOMIC STABILITY: TRANSPORTATION INSECURITY
IN THE PAST 12 MONTHS, HAS LACK OF TRANSPORTATION KEPT YOU FROM MEETINGS, WORK, OR FROM GETTING THINGS NEEDED FOR DAILY LIVING?: NO

## 2025-03-28 ENCOUNTER — OFFICE VISIT (OUTPATIENT)
Age: 50
End: 2025-03-28

## 2025-03-28 VITALS
OXYGEN SATURATION: 98 % | HEART RATE: 60 BPM | WEIGHT: 183 LBS | SYSTOLIC BLOOD PRESSURE: 120 MMHG | TEMPERATURE: 98 F | BODY MASS INDEX: 30.49 KG/M2 | HEIGHT: 65 IN | RESPIRATION RATE: 20 BRPM | DIASTOLIC BLOOD PRESSURE: 82 MMHG

## 2025-03-28 DIAGNOSIS — E78.00 ELEVATED CHOLESTEROL: ICD-10-CM

## 2025-03-28 DIAGNOSIS — K51.20 ULCERATIVE PROCTITIS WITHOUT COMPLICATION (HCC): ICD-10-CM

## 2025-03-28 DIAGNOSIS — Z23 ENCOUNTER FOR IMMUNIZATION: ICD-10-CM

## 2025-03-28 DIAGNOSIS — Z95.5 HISTORY OF PLACEMENT OF STENT IN LAD CORONARY ARTERY: ICD-10-CM

## 2025-03-28 DIAGNOSIS — Z86.79 HX OF VENTRICULAR FIBRILLATION: ICD-10-CM

## 2025-03-28 DIAGNOSIS — Z86.79 HX OF CORONARY ARTERY DISEASE: Primary | ICD-10-CM

## 2025-03-28 DIAGNOSIS — R63.5 WEIGHT GAIN: ICD-10-CM

## 2025-03-28 DIAGNOSIS — Z13.1 SCREENING FOR DIABETES MELLITUS: ICD-10-CM

## 2025-03-28 DIAGNOSIS — E66.9 OBESITY (BMI 30-39.9): ICD-10-CM

## 2025-03-28 ASSESSMENT — PATIENT HEALTH QUESTIONNAIRE - PHQ9
SUM OF ALL RESPONSES TO PHQ QUESTIONS 1-9: 0
2. FEELING DOWN, DEPRESSED OR HOPELESS: NOT AT ALL
1. LITTLE INTEREST OR PLEASURE IN DOING THINGS: NOT AT ALL
SUM OF ALL RESPONSES TO PHQ QUESTIONS 1-9: 0

## 2025-03-28 NOTE — PROGRESS NOTES
Svitlana Frankel (:  1975) is a 50 y.o. female, Established patient, here for evaluation of the following chief complaint(s):  Constipation and Weight Management         Assessment & Plan  1. Obesity with CAD  Her weight has increased since her last visit. She is currently exercising for 3-4 hours per week and following a calorie-restricted diet.with weight watchers   Given her cardiac history, phentermine is not an option. A prescription for Zepbound will be sent to [facility].  No hx of thyroid cancer  She is advised to continue her current exercise regimen and dietary restrictions. If the insurance does not approve Zepbound, alternative options will be considered.  She will follow up every 3 months to document weight loss for insurance purposes.    2. Constipation.  She reports ongoing issues with constipation despite dietary changes and increased physical activity.  She has added a stool softener to her regimen. Due to her history of ulcerative colitis, medications like Linzess are not recommended without GI consultation.  She is scheduled for a colonoscopy on 2025 to further investigate the cause of her constipation.  Further management will be deferred to her gastroenterologist.    3. Ulcerative colitis.  She is currently using mesalamine suppositories.  Further management will be deferred to her gastroenterologist.  Constipation management is complicated by her ulcerative colitis history.  GI consultation is necessary before prescribing additional medications.    4. Cardiac history.  She had a heart attack and ventricular fibrillation in  and had 2 drug-eluting stents placed.  She saw Dr. Sanjana Bello on 2024, who plans to stop Plavix this month.  She is on aspirin 81 mg, Lipitor 80 mg, and metoprolol 25 mg extended release. Her blood pressure is well-controlled.  A fasting blood work will be conducted to assess hemoglobin A1c, kidney function, liver function,

## 2025-03-28 NOTE — PROGRESS NOTES
\"Have you been to the ER, urgent care clinic since your last visit?  Hospitalized since your last visit?\"    NO    “Have you seen or consulted any other health care providers outside our system since your last visit?”    NO      “Have you had a colorectal cancer screening such as a colonoscopy/FIT/Cologuard?    NO, scheduled for 4/23    Date of last Colonoscopy: 1/22/2021  No cologuard on file  No FIT/FOBT on file   No flexible sigmoidoscopy on file

## 2025-03-28 NOTE — PATIENT INSTRUCTIONS
Zepbound for weight loss will attempt on prior authorization, it is very difficult to obtain . If able to obtain will need to high protein. Follow up in 3 months

## 2025-04-09 LAB
BASOPHILS # BLD AUTO: 0 X10E3/UL (ref 0–0.2)
BASOPHILS NFR BLD AUTO: 1 %
EOSINOPHIL # BLD AUTO: 0.1 X10E3/UL (ref 0–0.4)
EOSINOPHIL NFR BLD AUTO: 2 %
ERYTHROCYTE [DISTWIDTH] IN BLOOD BY AUTOMATED COUNT: 12.6 % (ref 11.7–15.4)
HCT VFR BLD AUTO: 35.7 % (ref 34–46.6)
HGB BLD-MCNC: 11.5 G/DL (ref 11.1–15.9)
IMM GRANULOCYTES # BLD AUTO: 0 X10E3/UL (ref 0–0.1)
IMM GRANULOCYTES NFR BLD AUTO: 0 %
LYMPHOCYTES # BLD AUTO: 1.7 X10E3/UL (ref 0.7–3.1)
LYMPHOCYTES NFR BLD AUTO: 26 %
MCH RBC QN AUTO: 28.5 PG (ref 26.6–33)
MCHC RBC AUTO-ENTMCNC: 32.2 G/DL (ref 31.5–35.7)
MCV RBC AUTO: 89 FL (ref 79–97)
MONOCYTES # BLD AUTO: 0.7 X10E3/UL (ref 0.1–0.9)
MONOCYTES NFR BLD AUTO: 11 %
NEUTROPHILS # BLD AUTO: 3.9 X10E3/UL (ref 1.4–7)
NEUTROPHILS NFR BLD AUTO: 60 %
PLATELET # BLD AUTO: 295 X10E3/UL (ref 150–450)
RBC # BLD AUTO: 4.03 X10E6/UL (ref 3.77–5.28)
WBC # BLD AUTO: 6.4 X10E3/UL (ref 3.4–10.8)

## 2025-04-10 LAB
ALBUMIN SERPL-MCNC: 4.3 G/DL (ref 3.8–4.9)
ALP SERPL-CCNC: 57 IU/L (ref 44–121)
ALT SERPL-CCNC: 12 IU/L (ref 0–32)
AST SERPL-CCNC: 27 IU/L (ref 0–40)
BILIRUB SERPL-MCNC: 0.3 MG/DL (ref 0–1.2)
BUN SERPL-MCNC: 15 MG/DL (ref 6–24)
BUN/CREAT SERPL: 18 (ref 9–23)
CALCIUM SERPL-MCNC: 9.1 MG/DL (ref 8.7–10.2)
CHLORIDE SERPL-SCNC: 101 MMOL/L (ref 96–106)
CHOLEST SERPL-MCNC: 125 MG/DL (ref 100–199)
CO2 SERPL-SCNC: 22 MMOL/L (ref 20–29)
CREAT SERPL-MCNC: 0.85 MG/DL (ref 0.57–1)
EGFRCR SERPLBLD CKD-EPI 2021: 83 ML/MIN/1.73
GLOBULIN SER CALC-MCNC: 2.6 G/DL (ref 1.5–4.5)
GLUCOSE SERPL-MCNC: 91 MG/DL (ref 70–99)
HBA1C MFR BLD: 5.7 % (ref 4.8–5.6)
HDLC SERPL-MCNC: 56 MG/DL
LDLC SERPL CALC-MCNC: 57 MG/DL (ref 0–99)
POTASSIUM SERPL-SCNC: 4.1 MMOL/L (ref 3.5–5.2)
PROT SERPL-MCNC: 6.9 G/DL (ref 6–8.5)
SODIUM SERPL-SCNC: 137 MMOL/L (ref 134–144)
TRIGL SERPL-MCNC: 52 MG/DL (ref 0–149)
TSH SERPL DL<=0.005 MIU/L-ACNC: 2.87 UIU/ML (ref 0.45–4.5)
VLDLC SERPL CALC-MCNC: 12 MG/DL (ref 5–40)

## 2025-04-16 ENCOUNTER — RESULTS FOLLOW-UP (OUTPATIENT)
Age: 50
End: 2025-04-16

## 2025-08-18 ENCOUNTER — TRANSCRIBE ORDERS (OUTPATIENT)
Facility: HOSPITAL | Age: 50
End: 2025-08-18

## 2025-08-18 DIAGNOSIS — Z12.31 VISIT FOR SCREENING MAMMOGRAM: Primary | ICD-10-CM

## 2025-09-05 ENCOUNTER — HOSPITAL ENCOUNTER (OUTPATIENT)
Facility: HOSPITAL | Age: 50
Discharge: HOME | End: 2025-09-05
Payer: COMMERCIAL

## 2025-09-05 DIAGNOSIS — Z12.31 VISIT FOR SCREENING MAMMOGRAM: ICD-10-CM

## 2025-09-05 PROCEDURE — 77063 BREAST TOMOSYNTHESIS BI: CPT
